# Patient Record
Sex: MALE | Race: ASIAN | NOT HISPANIC OR LATINO | Employment: UNEMPLOYED
[De-identification: names, ages, dates, MRNs, and addresses within clinical notes are randomized per-mention and may not be internally consistent; named-entity substitution may affect disease eponyms.]

---

## 2017-01-11 ENCOUNTER — RECORDS - HEALTHEAST (OUTPATIENT)
Dept: ADMINISTRATIVE | Facility: OTHER | Age: 62
End: 2017-01-11

## 2017-03-19 ENCOUNTER — COMMUNICATION - HEALTHEAST (OUTPATIENT)
Dept: INTERNAL MEDICINE | Facility: CLINIC | Age: 62
End: 2017-03-19

## 2017-03-19 DIAGNOSIS — D64.9 ANEMIA: ICD-10-CM

## 2017-03-29 ENCOUNTER — COMMUNICATION - HEALTHEAST (OUTPATIENT)
Dept: UROLOGY | Facility: CLINIC | Age: 62
End: 2017-03-29

## 2017-03-29 DIAGNOSIS — R82.994 HYPERCALCIURIA: ICD-10-CM

## 2017-04-05 ENCOUNTER — OFFICE VISIT - HEALTHEAST (OUTPATIENT)
Dept: INTERNAL MEDICINE | Facility: CLINIC | Age: 62
End: 2017-04-05

## 2017-04-05 DIAGNOSIS — N20.0 CALCULUS OF KIDNEY: ICD-10-CM

## 2017-04-05 DIAGNOSIS — R82.994 HYPERCALCIURIA: ICD-10-CM

## 2017-04-05 DIAGNOSIS — D64.9 ANEMIA: ICD-10-CM

## 2017-04-05 DIAGNOSIS — E66.3 OVERWEIGHT (BMI 25.0-29.9): ICD-10-CM

## 2017-04-05 DIAGNOSIS — E11.9 DIABETES (H): ICD-10-CM

## 2017-04-05 DIAGNOSIS — K11.7 XEROSTOMIA: ICD-10-CM

## 2017-04-05 DIAGNOSIS — E55.9 HYPOVITAMINOSIS D: ICD-10-CM

## 2017-04-05 LAB — HBA1C MFR BLD: 6.5 % (ref 3.5–6)

## 2017-04-06 ENCOUNTER — COMMUNICATION - HEALTHEAST (OUTPATIENT)
Dept: INTERNAL MEDICINE | Facility: CLINIC | Age: 62
End: 2017-04-06

## 2017-04-06 DIAGNOSIS — E11.9 DIABETES (H): ICD-10-CM

## 2017-04-06 LAB — ANA SER QL: 0.1 U

## 2017-04-24 ENCOUNTER — COMMUNICATION - HEALTHEAST (OUTPATIENT)
Dept: INTERNAL MEDICINE | Facility: CLINIC | Age: 62
End: 2017-04-24

## 2017-05-09 ENCOUNTER — COMMUNICATION - HEALTHEAST (OUTPATIENT)
Dept: INTERNAL MEDICINE | Facility: CLINIC | Age: 62
End: 2017-05-09

## 2017-05-09 DIAGNOSIS — E78.00 PURE HYPERCHOLESTEROLEMIA: ICD-10-CM

## 2017-06-21 ENCOUNTER — COMMUNICATION - HEALTHEAST (OUTPATIENT)
Dept: INTERNAL MEDICINE | Facility: CLINIC | Age: 62
End: 2017-06-21

## 2017-06-21 DIAGNOSIS — E11.9 DIABETES (H): ICD-10-CM

## 2017-06-26 ENCOUNTER — COMMUNICATION - HEALTHEAST (OUTPATIENT)
Dept: INTERNAL MEDICINE | Facility: CLINIC | Age: 62
End: 2017-06-26

## 2017-06-26 DIAGNOSIS — E11.9 DIABETES (H): ICD-10-CM

## 2017-08-06 ENCOUNTER — COMMUNICATION - HEALTHEAST (OUTPATIENT)
Dept: INTERNAL MEDICINE | Facility: CLINIC | Age: 62
End: 2017-08-06

## 2017-08-06 DIAGNOSIS — R82.994 HYPERCALCIURIA: ICD-10-CM

## 2017-10-27 ENCOUNTER — OFFICE VISIT - HEALTHEAST (OUTPATIENT)
Dept: FAMILY MEDICINE | Facility: CLINIC | Age: 62
End: 2017-10-27

## 2017-10-27 DIAGNOSIS — Z00.00 ROUTINE GENERAL MEDICAL EXAMINATION AT A HEALTH CARE FACILITY: ICD-10-CM

## 2017-10-27 DIAGNOSIS — Z23 FLU VACCINE NEED: ICD-10-CM

## 2017-10-27 DIAGNOSIS — E78.00 PURE HYPERCHOLESTEROLEMIA: ICD-10-CM

## 2017-10-27 DIAGNOSIS — E11.9 DIABETES MELLITUS (H): ICD-10-CM

## 2017-10-27 LAB
CHOLEST SERPL-MCNC: 153 MG/DL
FASTING STATUS PATIENT QL REPORTED: YES
HBA1C MFR BLD: 6.9 % (ref 3.5–6)
HDLC SERPL-MCNC: 40 MG/DL
LDLC SERPL CALC-MCNC: 91 MG/DL
TRIGL SERPL-MCNC: 112 MG/DL

## 2017-10-27 ASSESSMENT — MIFFLIN-ST. JEOR: SCORE: 1606.61

## 2017-12-03 ENCOUNTER — COMMUNICATION - HEALTHEAST (OUTPATIENT)
Dept: INTERNAL MEDICINE | Facility: CLINIC | Age: 62
End: 2017-12-03

## 2017-12-03 DIAGNOSIS — R82.994 HYPERCALCIURIA: ICD-10-CM

## 2017-12-03 DIAGNOSIS — I10 HYPERTENSION, UNSPECIFIED TYPE: ICD-10-CM

## 2018-01-08 ENCOUNTER — COMMUNICATION - HEALTHEAST (OUTPATIENT)
Dept: UROLOGY | Facility: CLINIC | Age: 63
End: 2018-01-08

## 2018-01-08 ENCOUNTER — AMBULATORY - HEALTHEAST (OUTPATIENT)
Dept: LAB | Facility: CLINIC | Age: 63
End: 2018-01-08

## 2018-01-08 DIAGNOSIS — N20.9 URINARY TRACT STONES: ICD-10-CM

## 2018-01-09 LAB
CALCIUM 24H UR-MRATE: 290 MG/24HR (ref 25–300)
CHLORIDE 24H UR-SRATE: 179 MMOL/24HR (ref 110–250)
CITRATE 24H UR-MCNC: 321 MG/24HR (ref 434–1191)
CREATININE, 24 HR URINE - HISTORICAL: 1587.6 MG/24HR (ref 1000–2000)
MAGNESIUM 24H UR-MRATE: 88 MG/24 HR (ref 75–150)
OXALATE MG/SPEC: 45.4 MG/24HR (ref 7–44)
PH UR STRIP: 7 [PH] (ref 4.5–8)
PHOSPHORUS URINE MG/SPEC: 789.6 MG/24HR (ref 400–1300)
POTASSIUM 24H UR-SCNC: 63 MMOL/24HR (ref 30–90)
SODIUM 24H UR-SRATE: 191 MMOL/24HR (ref 40–217)
SPECIMEN VOL UR: 2100 ML
URIC ACID URINE MG/SPEC: 706 MG/24HR (ref 250–750)

## 2018-01-16 ENCOUNTER — COMMUNICATION - HEALTHEAST (OUTPATIENT)
Dept: FAMILY MEDICINE | Facility: CLINIC | Age: 63
End: 2018-01-16

## 2018-01-16 DIAGNOSIS — I10 HYPERTENSION, UNSPECIFIED TYPE: ICD-10-CM

## 2018-01-24 ENCOUNTER — AMBULATORY - HEALTHEAST (OUTPATIENT)
Dept: UROLOGY | Facility: CLINIC | Age: 63
End: 2018-01-24

## 2018-01-24 DIAGNOSIS — N20.0 CALCULUS OF KIDNEY: ICD-10-CM

## 2018-01-27 ENCOUNTER — COMMUNICATION - HEALTHEAST (OUTPATIENT)
Dept: INTERNAL MEDICINE | Facility: CLINIC | Age: 63
End: 2018-01-27

## 2018-01-27 DIAGNOSIS — E11.9 DIABETES (H): ICD-10-CM

## 2018-02-05 ENCOUNTER — OFFICE VISIT - HEALTHEAST (OUTPATIENT)
Dept: UROLOGY | Facility: CLINIC | Age: 63
End: 2018-02-05

## 2018-02-05 ENCOUNTER — HOSPITAL ENCOUNTER (OUTPATIENT)
Dept: CT IMAGING | Facility: CLINIC | Age: 63
Discharge: HOME | End: 2018-02-05
Attending: PHYSICIAN ASSISTANT

## 2018-02-05 DIAGNOSIS — R82.994 HYPERCALCIURIA: ICD-10-CM

## 2018-02-05 DIAGNOSIS — N20.0 CALCULUS OF KIDNEY: ICD-10-CM

## 2018-02-05 DIAGNOSIS — R82.991 HYPOCITRATURIA: ICD-10-CM

## 2018-02-05 DIAGNOSIS — N20.9 URINARY TRACT STONES: ICD-10-CM

## 2018-02-05 DIAGNOSIS — I10 HYPERTENSION, UNSPECIFIED TYPE: ICD-10-CM

## 2018-02-05 DIAGNOSIS — R82.992 HYPEROXALURIA: ICD-10-CM

## 2018-02-05 LAB
ALBUMIN UR-MCNC: NEGATIVE MG/DL
APPEARANCE UR: CLEAR
BILIRUB UR QL STRIP: NEGATIVE
COLOR UR AUTO: YELLOW
GLUCOSE UR STRIP-MCNC: NEGATIVE MG/DL
HGB UR QL STRIP: NEGATIVE
KETONES UR STRIP-MCNC: NEGATIVE MG/DL
LEUKOCYTE ESTERASE UR QL STRIP: NEGATIVE
NITRATE UR QL: NEGATIVE
PH UR STRIP: 7 [PH] (ref 5–8)
SP GR UR STRIP: 1.01 (ref 1–1.03)
UROBILINOGEN UR STRIP-ACNC: NORMAL

## 2018-02-06 ENCOUNTER — COMMUNICATION - HEALTHEAST (OUTPATIENT)
Dept: INTERNAL MEDICINE | Facility: CLINIC | Age: 63
End: 2018-02-06

## 2018-02-06 DIAGNOSIS — E11.9 DIABETES (H): ICD-10-CM

## 2018-02-19 ENCOUNTER — OFFICE VISIT (OUTPATIENT)
Dept: OPHTHALMOLOGY | Facility: CLINIC | Age: 63
End: 2018-02-19
Attending: OPHTHALMOLOGY
Payer: COMMERCIAL

## 2018-02-19 DIAGNOSIS — H43.811 VITREOUS DEGENERATION OF RIGHT EYE: ICD-10-CM

## 2018-02-19 DIAGNOSIS — H52.203 MYOPIA WITH ASTIGMATISM AND PRESBYOPIA, BILATERAL: ICD-10-CM

## 2018-02-19 DIAGNOSIS — H52.4 MYOPIA WITH ASTIGMATISM AND PRESBYOPIA, BILATERAL: ICD-10-CM

## 2018-02-19 DIAGNOSIS — H52.13 MYOPIA WITH ASTIGMATISM AND PRESBYOPIA, BILATERAL: ICD-10-CM

## 2018-02-19 DIAGNOSIS — E11.9 TYPE 2 DIABETES MELLITUS WITHOUT COMPLICATION, WITHOUT LONG-TERM CURRENT USE OF INSULIN (H): Primary | ICD-10-CM

## 2018-02-19 PROCEDURE — G0463 HOSPITAL OUTPT CLINIC VISIT: HCPCS | Mod: ZF

## 2018-02-19 PROCEDURE — 92015 DETERMINE REFRACTIVE STATE: CPT | Mod: ZF

## 2018-02-19 RX ORDER — PRAVASTATIN SODIUM 20 MG
TABLET ORAL DAILY
COMMUNITY
End: 2021-08-06

## 2018-02-19 ASSESSMENT — VISUAL ACUITY
OD_SC: 20/30
METHOD: SNELLEN - LINEAR
OS_SC: 20/20
OS_SC+: -1
OD_SC+: -2

## 2018-02-19 ASSESSMENT — REFRACTION_MANIFEST
OS_CYLINDER: +0.50
OS_SPHERE: -0.50
OD_CYLINDER: +1.25
OS_ADD: +2.50
OS_AXIS: 150
OD_SPHERE: -0.25
OD_AXIS: 025
OD_ADD: +2.50

## 2018-02-19 ASSESSMENT — CONF VISUAL FIELD
METHOD: COUNTING FINGERS
OD_NORMAL: 1
OS_NORMAL: 1

## 2018-02-19 ASSESSMENT — TONOMETRY
OS_IOP_MMHG: 15
OD_IOP_MMHG: 14
IOP_METHOD: TONOPEN

## 2018-02-19 ASSESSMENT — SLIT LAMP EXAM - LIDS
COMMENTS: UPPER LID DERMATOCHALASIS
COMMENTS: UPPER LID DERMATOCHALASIS

## 2018-02-19 ASSESSMENT — EXTERNAL EXAM - RIGHT EYE: OD_EXAM: NORMAL

## 2018-02-19 ASSESSMENT — CUP TO DISC RATIO
OS_RATIO: 0.25
OD_RATIO: 0.25

## 2018-02-19 ASSESSMENT — EXTERNAL EXAM - LEFT EYE: OS_EXAM: NORMAL

## 2018-02-19 NOTE — NURSING NOTE
Chief Complaints and History of Present Illnesses   Patient presents with     Eye Exam For Diabetes     Yearly eye exam for DM     HPI    Affected eye(s):  Both   Symptoms:     No floaters   Flashes (Comment: In right eye to the right side much more often than usual)   No redness   No tearing   Dryness (Comment: slight dryness ocuassionaly )         Do you have eye pain now?:  No      Comments:  Pt notices when closes right eye a small blurriness runs across his vision. Bright flashes in right eye when looking to the right for the past 2 weeks. Pt says vision is the same as last time  Blood sugar 114 this morning   A1C from October 2017 is 6.7  Lab Results       Component                Value               Date                       A1C                      6.2                 05/02/2011                 A1C                      7.1                 05/25/2010              Genie Parham February 19, 2018 7:54 AM   Abelardo ROMAN February 19, 2018 8:15 AM

## 2018-02-19 NOTE — MR AVS SNAPSHOT
After Visit Summary   2018    Kit Martin    MRN: [id number]           Patient Information     Date Of Birth          1955        Visit Information        Provider Department      2018 7:45 AM Erin Cab[facility]        Today's Diagnoses     Type 2 diabetes mellitus without complication, without long-term current use of insulin (H) - Both Eyes    -  1    Myopia with astigmatism and presbyopia, bilateral - Both Eyes        Vitreous degeneration of right eye           Follow-ups after your visit        Follow-up notes from your care team     Return in about 1 year (around 2019) for Annual Visit.      Who to contact     Please call your clinic at [phone] to:    Ask questions about your health    Make or cancel appointments    Discuss your medicines    Learn about your test results    Speak to your doctor            Additional Information About Your Visit        MyChart Information     [employer] is an electronic gateway that provides easy, online access to your medical records. With [employer], you can request a clinic appointment, read your test results, renew a prescription or communicate with your care team.     To sign up for [employer] visit the website at [email or web address]   You will be asked to enter the access code listed below, as well as some personal information. Please follow the directions to create your username and password.     Your access code is: [email or web address]  Expires: 2018  6:30 AM     Your access code will  in 90 days. If you need help or a new code, please contact your [facility] or call [phone] for assistance.        Care EveryWhere ID     This is your Care EveryWhere ID. This could be used by other organizations to access your Mobile medical records  [id number]         Blood Pressure from Last 3 Encounters:   11 126/70   08/01/10 115/65   05/25/10 110/70    Weight from Last 3  Encounters:   05/02/11 83 kg (183 lb)   07/30/10 84.4 kg (186 lb)   05/25/10 88.5 kg (195 lb)              Today, you had the following     No orders found for display       Primary Care Provider Office Phone # Fax #    Naga Weir [phone] [address] [place] [address]        Equal Access to Services     [facility] AH: Hadii aad ku hadasho Soomaali, waaxda luqadaha, qaybta kaalmada adeegyada, waxay idiin hayaan adeeg kharash la'aan ah. So [facility] [phone].    ATENCIÓN: Si carley morel, tiene a kaiser disposición servicios gratuitos de asistencia lingüística. Llame al [phone].    We comply with applicable federal civil rights laws and [place] laws. We do not discriminate on the basis of race, color, national origin, age, disability, sex, sexual orientation, or gender identity.            Thank you!     Thank you for choosing [facility]  for your care. Our goal is always to provide you with excellent care. Hearing back from our patients is one way we can continue to improve our services. Please take a few minutes to complete the written survey that you may receive in the mail after your visit with us. Thank you!             Your Updated Medication List - Protect others around you: Learn how to safely use, store and throw away your medicines at www.disposemymeds.org.          This list is accurate as of 2/19/18  9:23 AM.  Always use your most recent med list.                   Brand Name Dispense Instructions for use Diagnosis    aspirin 81 MG tablet     100 tablet    Take 1 tablet by mouth daily.    Type 2 diabetes, HbA1c goal < 7% (H)       BLOOD GLUCOSE TEST STRIPS STRP     2 Strip    Check 2-4 times per day    Type II or unspecified type diabetes mellitus without mention of complication, uncontrolled       metFORMIN 500 MG tablet    GLUCOPHAGE     Take 500 mg by mouth 2 times daily (with meals)        POTASSIUM CITRATE PO      Take 1,080 mg by mouth daily        pravastatin  20 MG tablet    PRAVACHOL     Take by mouth daily        simvastatin 20 MG tablet    ZOCOR    90 tablet    Take 1 tablet by mouth At Bedtime.    Hyperlipidemia LDL goal <100       UNKNOWN TO PATIENT      1 tablet daily Pt uncertain of medication name, medication is for his kidneys        venlafaxine 150 MG 24 hr capsule    EFFEXOR-XR    90 capsule    Take 1 capsule by mouth daily. with food.    Mild major depression (H)

## 2018-02-19 NOTE — PROGRESS NOTES
HPI  Kit Martin is a 62 year old male here for diabetic annual eye exam. Diagnosed with Type II Diabetes mellitus 3-4 years ago, with HbA1c 7.7 10/2017. He complains of a single floater in his right eye with associated flashes that started a few weeks ago. No curtain.     POH: Presbyopia  GTTs: None  PMH: Type II Diabetes mellitus, last HbA1c 6.2  FH: Mother with glaucoma  SH: Non-smoker    Assessment & Plan    (E11.9) Type 2 diabetes mellitus without complication, without long-term current use of insulin (H) - Both Eyes  Comment: diagnosis 3-4 years ago; HbA1c 7.7 10/2017. No diabetic retinopathy.  Plan: Discussed the importance of tight blood glucose control in the prevention of diabetic retinopathy. Recommend yearly dilated eye exam.    (H43.811) Vitreous degeneration of right eye  Comment: No PVD seen, no Yusuf ring. Normal dilated exam.  Plan: Discussed signs/sx of RT/RD and the patient knows to call immediately if they develop these symptoms.     (H52.13,  H52.203,  H52.4) Myopia with astigmatism and presbyopia, bilateral - Both Eyes  (primary encounter diagnosis)  Comment: 20/20 best corrected, although he has good uncorrected distance acuity.  Plan: Given updated glasses Rx, optional to fill.     Patient disposition:   Return in about 1 year (around 2/19/2019) for Annual Visit. or sooner as needed.      Piotr Grijalva M.D.  Ophthalmology, PGY-2      Teaching statement:  Complete documentation of historical and exam elements from today's encounter can be found in the full encounter summary report (not reduplicated in this progress note). I personally obtained the chief complaint(s) and history of present illness.  I confirmed and edited as necessary the review of systems, past medical/surgical history, family history, social history, and examination findings as documented by others; and I examined the patient myself. I personally reviewed the relevant tests, images, and reports as documented above.     I  formulated and edited as necessary the assessment and plan and discussed the findings and management plan with the patient and family.    Erin Cabrera MD  Comprehensive Ophthalmology & Ocular Pathology  Department of Ophthalmology and Visual Neurosciences  [email or web address]  Pager [phone]

## 2018-03-12 ENCOUNTER — AMBULATORY - HEALTHEAST (OUTPATIENT)
Dept: LAB | Facility: CLINIC | Age: 63
End: 2018-03-12

## 2018-03-12 DIAGNOSIS — I10 HYPERTENSION, UNSPECIFIED TYPE: ICD-10-CM

## 2018-03-12 DIAGNOSIS — E11.9 DIABETES (H): ICD-10-CM

## 2018-03-12 LAB — AST SERPL W P-5'-P-CCNC: 19 U/L (ref 0–40)

## 2018-03-14 ENCOUNTER — COMMUNICATION - HEALTHEAST (OUTPATIENT)
Dept: FAMILY MEDICINE | Facility: CLINIC | Age: 63
End: 2018-03-14

## 2018-03-14 LAB
ANION GAP SERPL CALCULATED.3IONS-SCNC: 12 MMOL/L (ref 5–18)
CHLORIDE BLD-SCNC: 101 MMOL/L (ref 98–107)
CO2 SERPL-SCNC: 26 MMOL/L (ref 22–31)
CREAT SERPL-MCNC: 0.76 MG/DL (ref 0.7–1.3)
GFR SERPL CREATININE-BSD FRML MDRD: >60 ML/MIN/1.73M2
HBA1C MFR BLD: 7.1 % (ref 3.5–6)
POTASSIUM BLD-SCNC: 3.5 MMOL/L (ref 3.5–5)
SODIUM SERPL-SCNC: 139 MMOL/L (ref 136–145)

## 2018-03-19 ENCOUNTER — COMMUNICATION - HEALTHEAST (OUTPATIENT)
Dept: FAMILY MEDICINE | Facility: CLINIC | Age: 63
End: 2018-03-19

## 2018-04-24 ENCOUNTER — COMMUNICATION - HEALTHEAST (OUTPATIENT)
Dept: FAMILY MEDICINE | Facility: CLINIC | Age: 63
End: 2018-04-24

## 2018-04-24 DIAGNOSIS — E11.9 DM TYPE 2 (DIABETES MELLITUS, TYPE 2) (H): ICD-10-CM

## 2018-06-11 ENCOUNTER — COMMUNICATION - HEALTHEAST (OUTPATIENT)
Dept: FAMILY MEDICINE | Facility: CLINIC | Age: 63
End: 2018-06-11

## 2018-06-11 DIAGNOSIS — E78.00 PURE HYPERCHOLESTEROLEMIA: ICD-10-CM

## 2018-07-27 ENCOUNTER — COMMUNICATION - HEALTHEAST (OUTPATIENT)
Dept: FAMILY MEDICINE | Facility: CLINIC | Age: 63
End: 2018-07-27

## 2018-09-12 ENCOUNTER — COMMUNICATION - HEALTHEAST (OUTPATIENT)
Dept: FAMILY MEDICINE | Facility: CLINIC | Age: 63
End: 2018-09-12

## 2018-09-12 DIAGNOSIS — E78.00 PURE HYPERCHOLESTEROLEMIA: ICD-10-CM

## 2018-11-07 ENCOUNTER — COMMUNICATION - HEALTHEAST (OUTPATIENT)
Dept: FAMILY MEDICINE | Facility: CLINIC | Age: 63
End: 2018-11-07

## 2018-11-07 DIAGNOSIS — E11.9 DM TYPE 2 (DIABETES MELLITUS, TYPE 2) (H): ICD-10-CM

## 2018-11-12 ENCOUNTER — OFFICE VISIT - HEALTHEAST (OUTPATIENT)
Dept: FAMILY MEDICINE | Facility: CLINIC | Age: 63
End: 2018-11-12

## 2018-11-12 DIAGNOSIS — E11.9 TYPE 2 DIABETES MELLITUS WITHOUT COMPLICATION, WITHOUT LONG-TERM CURRENT USE OF INSULIN (H): ICD-10-CM

## 2018-11-12 DIAGNOSIS — E78.5 HYPERLIPIDEMIA LDL GOAL <70: ICD-10-CM

## 2018-11-12 DIAGNOSIS — Z00.00 ROUTINE GENERAL MEDICAL EXAMINATION AT A HEALTH CARE FACILITY: ICD-10-CM

## 2018-11-12 DIAGNOSIS — E11.9 DM TYPE 2 (DIABETES MELLITUS, TYPE 2) (H): ICD-10-CM

## 2018-11-12 LAB
CHOLEST SERPL-MCNC: 125 MG/DL
CREAT UR-MCNC: 42.9 MG/DL
ERYTHROCYTE [DISTWIDTH] IN BLOOD BY AUTOMATED COUNT: 13.2 % (ref 11–14.5)
FASTING STATUS PATIENT QL REPORTED: YES
HBA1C MFR BLD: 7.2 % (ref 3.5–6)
HCT VFR BLD AUTO: 41.6 % (ref 40–54)
HDLC SERPL-MCNC: 38 MG/DL
HGB BLD-MCNC: 13.1 G/DL (ref 14–18)
LDLC SERPL CALC-MCNC: 69 MG/DL
MCH RBC QN AUTO: 26.3 PG (ref 27–34)
MCHC RBC AUTO-ENTMCNC: 31.6 G/DL (ref 32–36)
MCV RBC AUTO: 83 FL (ref 80–100)
MICROALBUMIN UR-MCNC: <0.5 MG/DL (ref 0–1.99)
MICROALBUMIN/CREAT UR: NORMAL MG/G
PLATELET # BLD AUTO: 257 THOU/UL (ref 140–440)
PMV BLD AUTO: 9 FL (ref 7–10)
RBC # BLD AUTO: 4.99 MILL/UL (ref 4.4–6.2)
TRIGL SERPL-MCNC: 90 MG/DL
WBC: 6.7 THOU/UL (ref 4–11)

## 2018-11-12 ASSESSMENT — MIFFLIN-ST. JEOR: SCORE: 1595.25

## 2018-11-18 ENCOUNTER — COMMUNICATION - HEALTHEAST (OUTPATIENT)
Dept: FAMILY MEDICINE | Facility: CLINIC | Age: 63
End: 2018-11-18

## 2018-12-05 ENCOUNTER — COMMUNICATION - HEALTHEAST (OUTPATIENT)
Dept: FAMILY MEDICINE | Facility: CLINIC | Age: 63
End: 2018-12-05

## 2018-12-05 DIAGNOSIS — E11.9 DM TYPE 2 (DIABETES MELLITUS, TYPE 2) (H): ICD-10-CM

## 2018-12-26 ENCOUNTER — COMMUNICATION - HEALTHEAST (OUTPATIENT)
Dept: FAMILY MEDICINE | Facility: CLINIC | Age: 63
End: 2018-12-26

## 2018-12-26 DIAGNOSIS — E78.00 PURE HYPERCHOLESTEROLEMIA: ICD-10-CM

## 2019-01-31 ENCOUNTER — COMMUNICATION - HEALTHEAST (OUTPATIENT)
Dept: UROLOGY | Facility: CLINIC | Age: 64
End: 2019-01-31

## 2019-02-06 ENCOUNTER — AMBULATORY - HEALTHEAST (OUTPATIENT)
Dept: UROLOGY | Facility: CLINIC | Age: 64
End: 2019-02-06

## 2019-02-06 ENCOUNTER — AMBULATORY - HEALTHEAST (OUTPATIENT)
Dept: LAB | Facility: HOSPITAL | Age: 64
End: 2019-02-06

## 2019-02-06 DIAGNOSIS — N20.0 CALCULUS OF KIDNEY: ICD-10-CM

## 2019-02-06 LAB
ALBUMIN SERPL-MCNC: 4 G/DL (ref 3.5–5)
ANION GAP SERPL CALCULATED.3IONS-SCNC: 9 MMOL/L (ref 5–18)
BUN SERPL-MCNC: 10 MG/DL (ref 8–22)
CALCIUM SERPL-MCNC: 9.5 MG/DL (ref 8.5–10.5)
CHLORIDE BLD-SCNC: 99 MMOL/L (ref 98–107)
CO2 SERPL-SCNC: 31 MMOL/L (ref 22–31)
CREAT SERPL-MCNC: 0.78 MG/DL (ref 0.7–1.3)
GFR SERPL CREATININE-BSD FRML MDRD: >60 ML/MIN/1.73M2
GLUCOSE BLD-MCNC: 124 MG/DL (ref 70–125)
PHOSPHATE SERPL-MCNC: 2.6 MG/DL (ref 2.5–4.5)
POTASSIUM BLD-SCNC: 3.2 MMOL/L (ref 3.5–5)
SODIUM SERPL-SCNC: 139 MMOL/L (ref 136–145)

## 2019-02-07 ENCOUNTER — COMMUNICATION - HEALTHEAST (OUTPATIENT)
Dept: UROLOGY | Facility: CLINIC | Age: 64
End: 2019-02-07

## 2019-02-07 DIAGNOSIS — R82.991 HYPOCITRATURIA: ICD-10-CM

## 2019-02-07 LAB
COLLECT DURATION TIME SPEC: 24 HR
CREAT 24H UR-MRATE: 1305 MG/D (ref 800–2100)
CREAT UR-MCNC: 29 MG/DL
MAGNESIUM 24H UR-MRATE: 158 MG/D (ref 12–199)
MAGNESIUM UR-MCNC: 3.5 MG/DL
SPECIMEN VOL ?TM UR: 4500 ML

## 2019-02-08 LAB
CALCIUM 24H UR-MRATE: 383 MG/24HR (ref 25–300)
CHLORIDE 24H UR-SRATE: 234 MMOL/24HR (ref 110–250)
CITRATE 24H UR-MCNC: 613 MG/24HR
CREATININE, 24 HR URINE - HISTORICAL: ABNORMAL MG/24HR
MAGNESIUM 24H UR-MRATE: ABNORMAL MG/24 HR (ref 75–150)
OXALATE MG/SPEC: 57.6 MG/24HR (ref 7–44)
PH UR STRIP: 7 [PH] (ref 4.5–8)
PHOSPHORUS URINE MG/SPEC: 603 MG/24HR
POTASSIUM 24H UR-SCNC: 112 MMOL/24HR (ref 30–90)
SODIUM 24H UR-SRATE: 194 MMOL/24HR (ref 40–217)
SPECIMEN VOL UR: 4500 ML
URIC ACID URINE MG/SPEC: 522 MG/24HR (ref 250–750)

## 2019-02-12 ENCOUNTER — COMMUNICATION - HEALTHEAST (OUTPATIENT)
Dept: UROLOGY | Facility: CLINIC | Age: 64
End: 2019-02-12

## 2019-02-12 DIAGNOSIS — Z87.442 HISTORY OF KIDNEY STONES: ICD-10-CM

## 2019-02-18 ENCOUNTER — HOSPITAL ENCOUNTER (OUTPATIENT)
Dept: CT IMAGING | Facility: CLINIC | Age: 64
Discharge: HOME | End: 2019-02-18
Attending: PHYSICIAN ASSISTANT

## 2019-02-18 ENCOUNTER — OFFICE VISIT - HEALTHEAST (OUTPATIENT)
Dept: UROLOGY | Facility: CLINIC | Age: 64
End: 2019-02-18

## 2019-02-18 DIAGNOSIS — R82.992 HYPEROXALURIA: ICD-10-CM

## 2019-02-18 DIAGNOSIS — R82.994 HYPERCALCIURIA: ICD-10-CM

## 2019-02-18 DIAGNOSIS — Z87.442 HISTORY OF KIDNEY STONES: ICD-10-CM

## 2019-02-18 DIAGNOSIS — N20.0 CALCULUS OF KIDNEY: ICD-10-CM

## 2019-04-22 ENCOUNTER — AMBULATORY - HEALTHEAST (OUTPATIENT)
Dept: LAB | Facility: HOSPITAL | Age: 64
End: 2019-04-22

## 2019-04-22 DIAGNOSIS — N20.0 CALCULUS OF KIDNEY: ICD-10-CM

## 2019-04-23 LAB
CALCIUM 24H UR-MRATE: 319 MG/24HR (ref 25–300)
CHLORIDE 24H UR-SRATE: 204 MMOL/24HR (ref 110–250)
CITRATE 24H UR-MCNC: 153 MG/24HR
COLLECT DURATION TIME SPEC: 24 HR
CREAT 24H UR-MRATE: 1595 MG/D (ref 800–2100)
CREAT UR-MCNC: 58 MG/DL
CREATININE, 24 HR URINE - HISTORICAL: ABNORMAL MG/24HR
MAGNESIUM 24H UR-MRATE: 124 MG/D (ref 12–199)
MAGNESIUM 24H UR-MRATE: ABNORMAL MG/24 HR (ref 75–150)
MAGNESIUM UR-MCNC: 4.5 MG/DL
OXALATE MG/SPEC: 51.2 MG/24HR (ref 7–44)
PH UR STRIP: 7 [PH] (ref 4.5–8)
PHOSPHORUS URINE MG/SPEC: 500.5 MG/24HR
POTASSIUM 24H UR-SCNC: 104 MMOL/24HR (ref 30–90)
SODIUM 24H UR-SRATE: 162 MMOL/24HR (ref 40–217)
SPECIMEN VOL ?TM UR: 2750 ML
SPECIMEN VOL UR: 2750 ML
URIC ACID URINE MG/SPEC: 446 MG/24HR (ref 250–750)

## 2019-04-28 ENCOUNTER — COMMUNICATION - HEALTHEAST (OUTPATIENT)
Dept: UROLOGY | Facility: CLINIC | Age: 64
End: 2019-04-28

## 2019-04-28 DIAGNOSIS — I10 HYPERTENSION, UNSPECIFIED TYPE: ICD-10-CM

## 2019-05-07 ENCOUNTER — COMMUNICATION - HEALTHEAST (OUTPATIENT)
Dept: UROLOGY | Facility: CLINIC | Age: 64
End: 2019-05-07

## 2019-05-07 DIAGNOSIS — R82.991 HYPOCITRATURIA: ICD-10-CM

## 2019-05-20 ENCOUNTER — OFFICE VISIT - HEALTHEAST (OUTPATIENT)
Dept: UROLOGY | Facility: CLINIC | Age: 64
End: 2019-05-20

## 2019-05-20 DIAGNOSIS — Z87.442 HISTORY OF KIDNEY STONES: ICD-10-CM

## 2019-05-20 DIAGNOSIS — N20.0 CALCULUS OF KIDNEY: ICD-10-CM

## 2019-05-20 LAB
ALBUMIN UR-MCNC: NEGATIVE MG/DL
APPEARANCE UR: ABNORMAL
BILIRUB UR QL STRIP: NEGATIVE
COLOR UR AUTO: YELLOW
GLUCOSE UR STRIP-MCNC: NEGATIVE MG/DL
HGB UR QL STRIP: NEGATIVE
KETONES UR STRIP-MCNC: NEGATIVE MG/DL
LEUKOCYTE ESTERASE UR QL STRIP: NEGATIVE
NITRATE UR QL: NEGATIVE
PH UR STRIP: 6.5 [PH] (ref 5–8)
SP GR UR STRIP: 1.01 (ref 1–1.03)
UROBILINOGEN UR STRIP-ACNC: ABNORMAL

## 2019-07-30 ENCOUNTER — OFFICE VISIT (OUTPATIENT)
Dept: OPHTHALMOLOGY | Facility: CLINIC | Age: 64
End: 2019-07-30
Payer: COMMERCIAL

## 2019-07-30 DIAGNOSIS — H25.13 NUCLEAR SCLEROTIC CATARACT OF BOTH EYES: ICD-10-CM

## 2019-07-30 DIAGNOSIS — H52.223 REGULAR ASTIGMATISM OF BOTH EYES: ICD-10-CM

## 2019-07-30 DIAGNOSIS — H52.4 PRESBYOPIA OF BOTH EYES: ICD-10-CM

## 2019-07-30 DIAGNOSIS — E11.9 TYPE 2 DIABETES MELLITUS WITHOUT COMPLICATION, WITHOUT LONG-TERM CURRENT USE OF INSULIN (H): Primary | ICD-10-CM

## 2019-07-30 ASSESSMENT — REFRACTION_MANIFEST
OD_AXIS: 021
OS_ADD: +2.25
OS_CYLINDER: +0.75
OD_SPHERE: +0.25
OS_AXIS: 152
OD_ADD: +2.25
OS_SPHERE: -0.25
OD_CYLINDER: +1.25

## 2019-07-30 ASSESSMENT — SLIT LAMP EXAM - LIDS
COMMENTS: UPPER LID DERMATOCHALASIS
COMMENTS: UPPER LID DERMATOCHALASIS

## 2019-07-30 ASSESSMENT — CUP TO DISC RATIO
OD_RATIO: 0.25
OS_RATIO: 0.25

## 2019-07-30 ASSESSMENT — VISUAL ACUITY
OS_SC: 20/20
OD_SC: 20/20
METHOD_MR: SIGNS ARE CORRECT
METHOD: SNELLEN - LINEAR
OD_SC+: -3

## 2019-07-30 ASSESSMENT — CONF VISUAL FIELD
OS_NORMAL: 1
OD_NORMAL: 1
METHOD: COUNTING FINGERS

## 2019-07-30 ASSESSMENT — TONOMETRY
OS_IOP_MMHG: 14
OD_IOP_MMHG: 14
IOP_METHOD: ICARE

## 2019-07-30 ASSESSMENT — EXTERNAL EXAM - RIGHT EYE: OD_EXAM: NORMAL

## 2019-07-30 ASSESSMENT — EXTERNAL EXAM - LEFT EYE: OS_EXAM: NORMAL

## 2019-07-30 NOTE — PROGRESS NOTES
HPI  Kit Martin is a 64 year old male here for diabetic annual eye exam. Diagnosed with Type II Diabetes mellitus around 2014, with HbA1c 7.2 recently.  Feels vision not as clear as it used to be both eyes.     POH: Presbyopia, mostly over the counter readers   GTTs: None  PMH: Type II Diabetes mellitus, last HbA1c 7.2 - changing pcp  FH: Mother with glaucoma  SH: Non-smoker    Assessment & Plan       (E11.9) Type 2 diabetes mellitus without complication, without long-term current use of insulin (H) - Both Eyes  (primary encounter diagnosis)  Comment: No diabetic retinopathy.  Plan: Discussed the importance of tight blood glucose control in the prevention of diabetic retinopathy. Recommend yearly dilated eye exam.    (H52.4) Presbyopia of both eyes - Both Eyes  (H52.223) Regular astigmatism of both eyes - Both Eyes  Comment: may benefit from small distance prescription   Plan: manifest refraction done and prescription for glasses given     (H25.13) Nuclear sclerotic cataract of both eyes - Both Eyes  Comment: mild not visually significant   Plan: follow    Complete documentation of historical and exam elements from today's encounter can be found in the full encounter summary report (not reduplicated in this progress note). I personally obtained the chief complaint(s) and history of present illness.  I have confirmed and edited as necessary the CC, HPI, PMH/PSH, social history, FMH, ROS, and exam/neuro findings as obtained by the technician or others. I have examined this patient myself and I personally viewed the image(s) and studies listed above and the documentation reflects my findings and interpretation.  I formulated and edited as necessary the assessment and plan and discussed the findings and management plan with the patient and family.     Melanie Mata MD

## 2019-09-06 ENCOUNTER — COMMUNICATION - HEALTHEAST (OUTPATIENT)
Dept: FAMILY MEDICINE | Facility: CLINIC | Age: 64
End: 2019-09-06

## 2019-09-06 DIAGNOSIS — E11.9 DM TYPE 2 (DIABETES MELLITUS, TYPE 2) (H): ICD-10-CM

## 2019-09-09 ENCOUNTER — COMMUNICATION - HEALTHEAST (OUTPATIENT)
Dept: FAMILY MEDICINE | Facility: CLINIC | Age: 64
End: 2019-09-09

## 2019-09-09 DIAGNOSIS — E11.9 DM TYPE 2 (DIABETES MELLITUS, TYPE 2) (H): ICD-10-CM

## 2019-09-23 ENCOUNTER — OFFICE VISIT - HEALTHEAST (OUTPATIENT)
Dept: FAMILY MEDICINE | Facility: CLINIC | Age: 64
End: 2019-09-23

## 2019-09-23 DIAGNOSIS — E78.00 PURE HYPERCHOLESTEROLEMIA: ICD-10-CM

## 2019-09-23 DIAGNOSIS — I10 HYPERTENSION, UNSPECIFIED TYPE: ICD-10-CM

## 2019-09-23 DIAGNOSIS — E11.9 DM TYPE 2 (DIABETES MELLITUS, TYPE 2) (H): ICD-10-CM

## 2019-09-23 LAB
ANION GAP SERPL CALCULATED.3IONS-SCNC: 13 MMOL/L (ref 5–18)
CHLORIDE BLD-SCNC: 98 MMOL/L (ref 98–107)
CHOLEST SERPL-MCNC: 124 MG/DL
CO2 SERPL-SCNC: 29 MMOL/L (ref 22–31)
CREAT SERPL-MCNC: 0.77 MG/DL (ref 0.7–1.3)
FASTING STATUS PATIENT QL REPORTED: YES
GFR SERPL CREATININE-BSD FRML MDRD: >60 ML/MIN/1.73M2
HBA1C MFR BLD: 7 % (ref 3.5–6)
HDLC SERPL-MCNC: 39 MG/DL
LDLC SERPL CALC-MCNC: 63 MG/DL
POTASSIUM BLD-SCNC: 3.6 MMOL/L (ref 3.5–5)
SODIUM SERPL-SCNC: 140 MMOL/L (ref 136–145)
TRIGL SERPL-MCNC: 110 MG/DL

## 2019-09-23 ASSESSMENT — MIFFLIN-ST. JEOR: SCORE: 1592.05

## 2020-03-20 ENCOUNTER — COMMUNICATION - HEALTHEAST (OUTPATIENT)
Dept: FAMILY MEDICINE | Facility: CLINIC | Age: 65
End: 2020-03-20

## 2020-03-20 DIAGNOSIS — E11.9 DM TYPE 2 (DIABETES MELLITUS, TYPE 2) (H): ICD-10-CM

## 2020-06-14 ENCOUNTER — COMMUNICATION - HEALTHEAST (OUTPATIENT)
Dept: FAMILY MEDICINE | Facility: CLINIC | Age: 65
End: 2020-06-14

## 2020-06-14 DIAGNOSIS — E11.9 DM TYPE 2 (DIABETES MELLITUS, TYPE 2) (H): ICD-10-CM

## 2020-06-17 ENCOUNTER — AMBULATORY - HEALTHEAST (OUTPATIENT)
Dept: LAB | Facility: CLINIC | Age: 65
End: 2020-06-17

## 2020-06-17 DIAGNOSIS — E11.9 DM TYPE 2 (DIABETES MELLITUS, TYPE 2) (H): ICD-10-CM

## 2020-06-17 LAB — HBA1C MFR BLD: 6.4 % (ref 3.5–6)

## 2020-06-19 ENCOUNTER — OFFICE VISIT - HEALTHEAST (OUTPATIENT)
Dept: FAMILY MEDICINE | Facility: CLINIC | Age: 65
End: 2020-06-19

## 2020-06-19 DIAGNOSIS — E78.00 PURE HYPERCHOLESTEROLEMIA: ICD-10-CM

## 2020-06-19 DIAGNOSIS — E11.9 DM TYPE 2 (DIABETES MELLITUS, TYPE 2) (H): ICD-10-CM

## 2020-07-03 ENCOUNTER — OFFICE VISIT - HEALTHEAST (OUTPATIENT)
Dept: INTERNAL MEDICINE | Facility: CLINIC | Age: 65
End: 2020-07-03

## 2020-07-03 DIAGNOSIS — Z20.822 EXPOSURE TO COVID-19 VIRUS: ICD-10-CM

## 2020-07-03 DIAGNOSIS — Z13.220 ENCOUNTER FOR SCREENING FOR LIPOID DISORDERS: ICD-10-CM

## 2020-07-03 DIAGNOSIS — Z00.00 ENCOUNTER FOR GENERAL ADULT MEDICAL EXAMINATION WITHOUT ABNORMAL FINDINGS: ICD-10-CM

## 2020-07-03 DIAGNOSIS — N20.9 URINARY TRACT STONES: ICD-10-CM

## 2020-07-03 DIAGNOSIS — E78.00 PURE HYPERCHOLESTEROLEMIA: ICD-10-CM

## 2020-07-03 DIAGNOSIS — L30.9 DERMATITIS: ICD-10-CM

## 2020-07-03 DIAGNOSIS — E11.9 DM TYPE 2 (DIABETES MELLITUS, TYPE 2) (H): ICD-10-CM

## 2020-07-03 LAB
ALBUMIN UR-MCNC: NEGATIVE MG/DL
ANION GAP SERPL CALCULATED.3IONS-SCNC: 14 MMOL/L (ref 5–18)
APPEARANCE UR: CLEAR
BILIRUB UR QL STRIP: NEGATIVE
BUN SERPL-MCNC: 7 MG/DL (ref 8–22)
CALCIUM SERPL-MCNC: 9.8 MG/DL (ref 8.5–10.5)
CHLORIDE BLD-SCNC: 100 MMOL/L (ref 98–107)
CHOLEST SERPL-MCNC: 137 MG/DL
CO2 SERPL-SCNC: 27 MMOL/L (ref 22–31)
COLOR UR AUTO: YELLOW
CREAT SERPL-MCNC: 0.77 MG/DL (ref 0.7–1.3)
CREAT UR-MCNC: 88.9 MG/DL
FASTING STATUS PATIENT QL REPORTED: NORMAL
GFR SERPL CREATININE-BSD FRML MDRD: >60 ML/MIN/1.73M2
GLUCOSE BLD-MCNC: 102 MG/DL (ref 70–125)
GLUCOSE UR STRIP-MCNC: NEGATIVE MG/DL
HDLC SERPL-MCNC: 41 MG/DL
HGB UR QL STRIP: NEGATIVE
KETONES UR STRIP-MCNC: NEGATIVE MG/DL
LDLC SERPL CALC-MCNC: 69 MG/DL
LEUKOCYTE ESTERASE UR QL STRIP: NEGATIVE
MICROALBUMIN UR-MCNC: <0.5 MG/DL (ref 0–1.99)
MICROALBUMIN/CREAT UR: NORMAL MG/G{CREAT}
NITRATE UR QL: NEGATIVE
PH UR STRIP: 8.5 [PH] (ref 5–8)
POTASSIUM BLD-SCNC: 3.9 MMOL/L (ref 3.5–5)
SODIUM SERPL-SCNC: 141 MMOL/L (ref 136–145)
SP GR UR STRIP: 1.01 (ref 1–1.03)
TRIGL SERPL-MCNC: 133 MG/DL
UROBILINOGEN UR STRIP-ACNC: ABNORMAL

## 2020-07-03 ASSESSMENT — MIFFLIN-ST. JEOR: SCORE: 1579.59

## 2020-07-08 ENCOUNTER — COMMUNICATION - HEALTHEAST (OUTPATIENT)
Dept: INTERNAL MEDICINE | Facility: CLINIC | Age: 65
End: 2020-07-08

## 2020-07-09 ENCOUNTER — COMMUNICATION - HEALTHEAST (OUTPATIENT)
Dept: FAMILY MEDICINE | Facility: CLINIC | Age: 65
End: 2020-07-09

## 2021-03-09 ENCOUNTER — COMMUNICATION - HEALTHEAST (OUTPATIENT)
Dept: FAMILY MEDICINE | Facility: CLINIC | Age: 66
End: 2021-03-09

## 2021-05-18 ENCOUNTER — RECORDS - HEALTHEAST (OUTPATIENT)
Dept: FAMILY MEDICINE | Facility: CLINIC | Age: 66
End: 2021-05-18

## 2021-05-19 ENCOUNTER — COMMUNICATION - HEALTHEAST (OUTPATIENT)
Dept: INTERNAL MEDICINE | Facility: CLINIC | Age: 66
End: 2021-05-19

## 2021-05-28 ENCOUNTER — RECORDS - HEALTHEAST (OUTPATIENT)
Dept: ADMINISTRATIVE | Facility: CLINIC | Age: 66
End: 2021-05-28

## 2021-05-28 NOTE — PROGRESS NOTES
Assessment/Plan:        Diagnoses and all orders for this visit:    Calculus of kidney  -     Stone Formation, 24 Hour Urine x1; Future; Expected date: 11/20/2019  -     Patient Stated Goal: Prevent further stones  -     CT Abdomen Pelvis Without Oral Without IV Contrast; Future; Expected date: 11/20/2019    History of kidney stones  -     Urinalysis Macroscopic      Stone Management Plan  [facility] Stone Management 2/5/2018 2/21/2019 5/20/2019   Urinary Tract Infection No suspicion of infection No suspicion of infection No suspicion of infection   Renal Colic Asymptomatic at this time Well controlled symptoms Asymptomatic at this time   Renal Failure No suspicion of renal failure No suspicion of renal failure No suspicion of renal failure   Current CT date 2/5/2018 2/18/2019 -   Right sided stones? Yes Yes -   R Number of ureteral stones No ureteral stones No ureteral stones -   R Number of kidney stones  2 Renal stones unchanged from last exam -   R GSD of kidney stones 2 - 4 - -   R Hydronephrosis None - -   R Stone Event No current event No current event -   Diagnosis date - - -   Initial location of primary symptomatic stone - - -   Initial GSD of primary symptomatic stone - - -   Resolved date - - -   R Post-op status - - -   R Current Plan Observe Observe -   Clear rationale - - -   Observe rationale Limited stone burden with good prognosis for spontaneous passage Limited stone burden with good prognosis for spontaneous passage -   Left sided stones? Yes Yes -   L Number of ureteral stones No ureteral stones - -   L Number of kidney stones  3 Renal stones unchanged from last exam -   L GSD of kidney stones 4 - 10 - -   L Hydronephrosis None - -   L Stone Event No current event No current event -   Diagnosis date - - -   Initial location of primary symptomatic stone - - -   Initial GSD of primary symptomatic stone - - -   Resolved date - - -   Post-op status - - -   L Current Plan Observe Observe -   Clear rationale  - - -   Observe rationale Limited stone burden with good prognosis for spontaneous passage Significant stone burden amenable to emergency management -             Subjective:      HPI  Mr. Kit Martin is a 63 y.o. German male returning to the [facility] for follow up of his stone disease.    He has been well in the interim. He has been attempting to reduce salt intake and has noticed that occasionally, some foods are tasting uncomfortably salty which is a good sign.    24 h urine demonstrates slight improvement in hypercalciuria and urine sodium but citrate is quite low.    He will stay on 25 mg chlorthalidone and increase pot citrate to 20 mEq two times a day. Will transition to OTC powder. Will continue to try to avoid dietary sodium.    Will recheck in 6 months with 24 h urine and CT     ROS   Review of systems is negative except for HPI.    Past Medical History:   Diagnosis Date     Anxiety      Depression      GERD (gastroesophageal reflux disease)      Hypercholesterolemia      Hypertension      Kidney stone      Overweight      PONV (postoperative nausea and vomiting)      Type 2 diabetes mellitus (H)        Past Surgical History:   Procedure Laterality Date     CYSTOSCOPY W/ URETERAL STENT PLACEMENT Bilateral 11/3/2015    Procedure: CYSTOSCOPY, BILATERAL URETEROSCOPY, LASER LITHOTRIPSY, STENT INSERTION;  Surgeon: Herman Lucia MD;  Location: [facility];  Service:      LITHOTRIPSY       WI CYSTOTOMY,EXTRACT &/OR FRAG URETER CALC      Description: Bladder Cystotomy With Basket Extraction Of Calculus;  Recorded: 02/01/2012;     WI FRAGMENT KIDNEY STONE/ ESWL Left     Description: Lithotripsy;  Recorded: 02/01/2012;  Comments: TIMES TWO.     URETEROSCOPY         Current Outpatient Medications   Medication Sig Dispense Refill     chlorthalidone (HYGROTEN) 25 MG tablet TAKE 1 TABLET (25 MG TOTAL) BY MOUTH DAILY. 90 tablet 4     generic lancets Use 1 each As Directed as  needed. OneTouch Lancets Miscellaneous. Use four times daily. 100 each 11     metFORMIN (GLUCOPHAGE) 500 MG tablet Take 2 tablets (1,000 mg total) by mouth daily with breakfast AND 1 tablet (500 mg total) daily with supper. 270 tablet 2     potassium citrate (UROCIT-K) 10 mEq (1,080 mg) SR tablet TAKE 2 TABLETS (20 MEQ TOTAL) BY MOUTH DAILY WITH BREAKFAST. 180 tablet 0     pravastatin (PRAVACHOL) 20 MG tablet Take 1 tablet (20 mg total) by mouth at bedtime. 90 tablet 3     ACCU-CHEK SMARTVIEW TEST STRIP strips TEST 1 TO 2 TIMES DAILY 100 strip 1     aspirin 81 MG EC tablet Take 1 tablet (81 mg total) by mouth daily.  0     blood glucose meter (ONETOUCH ULTRA2) With device kit. Test four times daily Dx: 250       No current facility-administered medications for this visit.        Allergies   Allergen Reactions     Dilaudid [Hydromorphone] Itching       Social History     Socioeconomic History     Marital status:      Spouse name: Not on file     Number of children: Not on file     Years of education: Not on file     Highest education level: Not on file   Occupational History     Occupation: [employer]     Employer: [employer]   Social Needs     Financial resource strain: Not on file     Food insecurity:     Worry: Not on file     Inability: Not on file     Transportation needs:     Medical: Not on file     Non-medical: Not on file   Tobacco Use     Smoking status: Never Smoker     Smokeless tobacco: Never Used   Substance and Sexual Activity     Alcohol use: Yes     Comment: Less than weekly.     Drug use: No     Sexual activity: Yes     Partners: Female   Lifestyle     Physical activity:     Days per week: Not on file     Minutes per session: Not on file     Stress: Not on file   Relationships     Social connections:     Talks on phone: Not on file     Gets together: Not on file     Attends Sabianism service: Not on file     Active member of club or organization: Not on file      "Attends meetings of clubs or organizations: Not on file     Relationship status: Not on file     Intimate partner violence:     Fear of current or ex partner: Not on file     Emotionally abused: Not on file     Physically abused: Not on file     Forced sexual activity: Not on file   Other Topics Concern     Not on file   Social History Narrative    .  Lives with wife.  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  Has 2 adult daughters.         Family History   Problem Relation Age of Onset     Heart disease Mother 67        Heart attack     Stroke Father          at 93     Other Sister 15         at 15, two years post surgery for non-union of a leg fracture that never did heal.     Stroke Brother         Mild stroke     Hyperlipidemia Brother      Hypertension Brother      Urolithiasis Brother         recurrent     Heart disease Brother 61        \"Heart attack\"     No Medical Problems Daughter      Hypertension Brother      Diabetes Brother      No Medical Problems Daughter      Objective:      Physical Exam  Vitals:    19 0846   BP: 124/64   Pulse: 82   Temp: 98.1  F (36.7  C)     General - well developed, well nourished, appropriate for age. Appears no distress at this time  Abdomen - mildly obese soft, non-tender, no hepatosplenomegaly, no masses.   - no flank tenderness, no suprapubic tenderness, kidney and bladder non-palpable  MSK - normal spinal curvature. no spinal tenderness. normal gait. muscular strength intact.  Psych - oriented to time, place, and person, normal mood and affect.      Labs   Urinalysis POC (Office):  Blood UA   Date Value Ref Range Status   2016 Trace Negative Final   2016 Negative Negative Final   2016 Negative Negative Final     Nitrite, UA   Date Value Ref Range Status   2019 Negative Negative Final   2019 Negative Negative Final   2018 Negative Negative Final     Leukocytes, UA    Date Value Ref Range Status   2016 Negative Negative " Final   07/13/2016 Negative Negative Final   02/29/2016 Negative Negative Final     pH UA   Date Value Ref Range Status   09/19/2016 6.5 4.5 - 8.0 Final   07/13/2016 7.0 4.5 - 8.0 Final   02/29/2016 6.5 4.5 - 8.0 Final       Lab Urinalysis:  Blood, UA   Date Value Ref Range Status   05/20/2019 Negative Negative Final   02/18/2019 Negative Negative Final   02/05/2018 Negative Negative Final     Nitrite, UA   Date Value Ref Range Status   05/20/2019 Negative Negative Final   02/18/2019 Negative Negative Final   02/05/2018 Negative Negative Final     Leukocytes, UA   Date Value Ref Range Status   05/20/2019 Negative Negative Final   02/18/2019 Negative Negative Final   02/05/2018 Negative Negative Final     pH, UA   Date Value Ref Range Status   05/20/2019 6.5 5.0 - 8.0 Final   02/18/2019 7.0 5.0 - 8.0 Final   02/05/2018 7.0 5.0 - 8.0 Final    and Stone prevention labs   24 hour urine   Creatinine, 24 Hour Urine   Date Value Ref Range Status   03/02/2012 1,241.2 1,000.0 - 2,000.0 mg/24Hr Final   01/10/2012 1,008.9 1,000.0 - 2,000.0 mg/24Hr Final   11/01/2011 1,749.0 1,000.0 - 2,000.0 mg/24Hr Final     Calcium, 24H Urine   Date Value Ref Range Status   04/22/2019 319 (H) 25 - 300 mg/24hr Final     Comment:       Hypercalciuria >350  Values are for persons with  average daily calcium intake  (600-800 mg/day)   02/06/2019 383 (H) 25 - 300 mg/24hr Final     Comment:       Hypercalciuria >350  Values are for persons with  average daily calcium intake  (600-800 mg/day)   01/08/2018 290 25 - 300 mg/24hr Final     Comment:       Hypercalciuria >350  Values are for persons with  average daily calcium intake  (600-800 mg/day)     Sodium, 24 Hour Urine   Date Value Ref Range Status   04/22/2019 162 40 - 217 mmol/24hr Final   02/06/2019 194 40 - 217 mmol/24hr Final   01/08/2018 191 40 - 217 mmol/24hr Final     Citrate, 24 Hour Urine   Date Value Ref Range Status   04/22/2019 153 (L) 434-1,191 mg/24hr Final     Comment:        Reference Ranges are not  established for 0-19 years  or >60 years of age.   02/06/2019 613 434-1,191 mg/24hr Final     Comment:       Reference Ranges are not  established for 0-19 years  or >60 years of age.   01/08/2018 321 (L) 434 - 1,191 mg/24hr Final     Comment:       Reference Ranges are not  established for 0-19 years  or >60 years of age.     Oxalate, 24 Hour Urine   Date Value Ref Range Status   04/22/2019 51.2 (H) 7.0 - 44.0 mg/24hr Final   02/06/2019 57.6 (H) 7.0 - 44.0 mg/24hr Final   01/08/2018 45.4 (H) 7.0 - 44.0 mg/24hr Final     pH, Urine   Date Value Ref Range Status   04/22/2019 7.0 4.5 - 8.0 Final   02/06/2019 7.0 4.5 - 8.0 Final   01/08/2018 7.0 4.5 - 8.0 Final     Urine Volume   Date Value Ref Range Status   04/22/2019 2,750 mL Final   02/06/2019 4,500 mL Final   01/08/2018 2,100 mL Final

## 2021-05-28 NOTE — PATIENT INSTRUCTIONS - HE
Patient Stated Goal: Prevent further stones  Potassium Citrate    This medication can be used in two ways to help stone formers.      Citrate in the urine makes it harder for stones to form and grow. Many stone patients do not have enough citrate in their urine and require a supplement.        Citrate is an effective way to make the urine less acidic. Uric acid stones only form when the urine is acidic and may dissolve if urine acidity can be reversed.      USES: This medication is used for low urinary citrate (hypocitraturia), renal tubular acidosis, uric acid stones, calcium oxalate stones, and cysteine stones.    HOW TO USE:     Potassium citrate comes in several different forms      Tablet - made of potassium citrate in a wax substance to allow delayed absorption. It is common to see a  ghost tablet  in the stool after all the potassium citrate has been absorbed. This is more common in people with diarrhea or other bowel disorders.      Powder - this can be dissolved in water or other clear fluids. To decrease stomach irritation and improve absorption, we recommend dissolving in at least 500 cc of fluid and drinking slowly. You do not have to drink this medication all at once. Many patients find the taste is improved by adding a sweetener.      Liquid - Similar to the powder, the liquid formulation is best dissolved in a large container of fluid and consumed slowly.     SIDE EFFECTS:  The primary side effect of this medication is stomach irritation, less so with the tablet. Some people find stomach irritation decreased by taking the medication with food and avoiding lying down for at least a half hour after consumption.    If you develop diarrhea, nausea, vomiting, stomach pain, fluid retention, convulsions, unusual weakness, mental confusion, tingling or numbness of the hands or feet or other potential side effects, notify your doctor or pharmacist promptly.                    *This is a summary and does not  contain all possible information about this product. For complete information about this product or your specific health needs, ask your healthcare professional. Always seek the advice of your healthcare professional if you have any questions about this product or your medical condition.

## 2021-05-30 VITALS — BODY MASS INDEX: 27.23 KG/M2 | WEIGHT: 189.8 LBS

## 2021-05-31 VITALS — BODY MASS INDEX: 28.7 KG/M2 | WEIGHT: 186 LBS

## 2021-05-31 VITALS — BODY MASS INDEX: 28.23 KG/M2 | WEIGHT: 186.3 LBS | HEIGHT: 68 IN

## 2021-06-01 ENCOUNTER — RECORDS - HEALTHEAST (OUTPATIENT)
Dept: ADMINISTRATIVE | Facility: CLINIC | Age: 66
End: 2021-06-01

## 2021-06-01 NOTE — PROGRESS NOTES
Assessment / Impression     1. DM - Type 2 diabetes  Glycosylated Hemoglobin A1c   2. Hypertension, unspecified type  Electrolyte Profile    Creatinine   3. Hypercholesterolemia  Lipid Cascade         Plan:     1. DM - Type 2 diabetes  Reviewed today's hemoglobin A1c today which has improved slightly to 7.0.  He is right on the borderline of goal, offered the option to increase metformin to 1000 mg twice daily, though patient declined at this time.  He will continue regular exercise and watch his diet.  Continue daily aspirin use.  -EMILY completed to get recent ophthalmology visit note.  - Glycosylated Hemoglobin A1c    2. Hypertension, unspecified type  At goal, will refill medications, check labs today.  - Electrolyte Profile  - Creatinine    3. Hypercholesterolemia  Fasting today, will check labs.  Refills of medication were given today.  - Lipid Cascade    Declined influenza vaccine.    Return in about 6 months (around 3/23/2020) for Diabetes Follow-up.    Subjective:      HPI: Kit Martin is a 64 y.o. male who presents for diabetes check.  He is currently taking metformin 1000 mg in the morning and 500 mg in the evening.  He checks his sugars regularly, fasting sugars are usually 105-120, however has noted that if he eats knots the night before he has noted a spike in his morning fasting glucose.  He denies any adverse effects of metformin.  -He had a recent eye exam within the last few months.    Hypertension: Currently only taking chlorthalidone.  Denies any adverse effects medication.  No headache, chest pain, vision changes.    Hyperlipidemia: He is fasting today.  Currently taking pravastatin.  No myalgias.    Medical History:     Patient Active Problem List   Diagnosis     Hypercholesterolemia     Anxiety Disorder NOS     DM - Type 2 diabetes     Overweight (BMI 25.0-29.9)     Hypercalciuria     Anemia     Leukocytosis     Urinary tract stones     Hypocitraturia       Past Medical History:   Diagnosis  Date     Anxiety      Depression      GERD (gastroesophageal reflux disease)      Hypercholesterolemia      Hypertension      Kidney stone      Overweight      PONV (postoperative nausea and vomiting)      Type 2 diabetes mellitus (H)        Past Surgical History:   Procedure Laterality Date     CYSTOSCOPY W/ URETERAL STENT PLACEMENT Bilateral 11/3/2015    Procedure: CYSTOSCOPY, BILATERAL URETEROSCOPY, LASER LITHOTRIPSY, STENT INSERTION;  Surgeon: Herman Lucia MD;  Location: [facility];  Service:      LITHOTRIPSY       VT CYSTOTOMY,EXTRACT &/OR FRAG URETER CALC      Description: Bladder Cystotomy With Basket Extraction Of Calculus;  Recorded: 2012;     VT FRAGMENT KIDNEY STONE/ ESWL Left     Description: Lithotripsy;  Recorded: 2012;  Comments: TIMES TWO.     URETEROSCOPY         Current Medications:     Current Outpatient Medications   Medication Sig     ACCU-CHEK SMARTVIEW TEST STRIP strips TEST 1 TO 2 TIMES DAILY     aspirin 81 MG EC tablet Take 1 tablet (81 mg total) by mouth daily.     blood glucose meter (ONETOUCH ULTRA2) With device kit. Test four times daily Dx: 250     generic lancets Use 1 each As Directed as needed. OneTouch Lancets Miscellaneous. Use four times daily.     metFORMIN (GLUCOPHAGE) 500 MG tablet Take 2 tabs by mouth daily with breakfast and 1 tab daily with supper. Needs appointment before additional refills.     pravastatin (PRAVACHOL) 20 MG tablet Take 1 tablet (20 mg total) by mouth at bedtime.     chlorthalidone (HYGROTEN) 25 MG tablet TAKE 1 TABLET (25 MG TOTAL) BY MOUTH DAILY.       Family History:     Family History   Problem Relation Age of Onset     Heart disease Mother 67        Heart attack     Stroke Father          at 93     Other Sister 15         at 15, two years post surgery for non-union of a leg fracture that never did heal.     Stroke Brother         Mild stroke     Hyperlipidemia Brother      Hypertension Brother      Urolithiasis Brother  "        recurrent     Heart disease Brother 61        \"Heart attack\"     No Medical Problems Daughter      Hypertension Brother      Diabetes Brother      No Medical Problems Daughter        Review of Systems  All other systems reviewed and are negative.         Social History:     Social History     Tobacco Use   Smoking Status Never Smoker   Smokeless Tobacco Never Used     Social History     Patient does not qualify to have social determinant information on file (likely too young).   Social History Narrative    .  Lives with wife.  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  Has 2 adult daughters.           Objective:     /60 (Patient Site: Left Arm, Patient Position: Sitting, Cuff Size: Adult Regular)   Pulse 82   Temp 98  F (36.7  C) (Oral)   Resp 16   Ht 5' 7.44\" (1.713 m)   Wt 183 lb 4.8 oz (83.1 kg)   BMI 28.34 kg/m    Physical Examination: General appearance - alert, well appearing, and in no distress  Eyes: pupils equal and reactive, extraocular eye movements intact  Mouth: mucous membranes moist, pharynx normal without lesions  Neck: supple, no significant adenopathy or thyromegaly  Lungs: clear to auscultation, no wheezes, rales or rhonchi, symmetric air entry  Heart: normal rate, regular rhythm, normal S1, S2, no murmurs.  Abdomen: soft, nontender, nondistended, no masses or organomegaly  Neurological: alert, oriented, normal speech, no focal findings or movement disorder noted.    Extremities: No edema, no clubbing or cyanosis.  Normal monofilament test.    Psychiatric: Normal affect. Does not appear anxious or depressed.    Recent Results (from the past 168 hour(s))   Glycosylated Hemoglobin A1c   Result Value Ref Range    Hemoglobin A1c 7.0 (H) 3.5 - 6.0 %         Paula Recio MD  9/23/2019  9:21 AM        "

## 2021-06-01 NOTE — TELEPHONE ENCOUNTER
RN cannot approve Refill Request    RN can NOT refill this medication PCP messaged that patient is overdue for Labs and Office Visit. Last office visit: Visit date not found Last Physical: Visit date not found Last MTM visit: Visit date not found Last visit same specialty: Visit date not found.  Next visit within 3 mo: Visit date not found  Next physical within 3 mo: Visit date not found      Sheryl Brambila, [facility] [place] Connection Triage/Med Refill 9/9/2019    Requested Prescriptions   Pending Prescriptions Disp Refills     metFORMIN (GLUCOPHAGE) 500 MG tablet [Pharmacy Med Name: METFORMIN  MG TABLET] 270 tablet 2     Sig: TAKE 2 TABLETS BY MOUTH DAILY WITH BREAKFAST AND 1 TABLET DAILY WITH SUPPER.       Metformin Refill Protocol Failed - 9/9/2019 11:03 AM        Failed - LFT or AST or ALT in last 12 months     Albumin   Date Value Ref Range Status   02/06/2019 4.0 3.5 - 5.0 g/dL Final     Bilirubin, Total   Date Value Ref Range Status   10/14/2016 0.5 0.0 - 1.0 mg/dL Final     Alkaline Phosphatase   Date Value Ref Range Status   10/14/2016 55 45 - 120 U/L Final     AST   Date Value Ref Range Status   03/12/2018 19 0 - 40 U/L Final     ALT   Date Value Ref Range Status   10/14/2016 42 0 - 45 U/L Final     Protein, Total   Date Value Ref Range Status   10/14/2016 6.9 6.0 - 8.0 g/dL Final                Failed - Visit with PCP or prescribing provider visit in last 6 months or next 3 months     Last office visit with prescriber/PCP: Visit date not found OR same dept: Visit date not found OR same specialty: Visit date not found Last physical: Visit date not found Last MTM visit: Visit date not found         Next appt within 3 mo: Visit date not found  Next physical within 3 mo: Visit date not found  Prescriber OR PCP: Rishabh Brock DO  Last diagnosis associated with med order: 1. DM type 2 (diabetes mellitus, type 2) (H)  - metFORMIN (GLUCOPHAGE) 500 MG tablet [Pharmacy Med Name: METFORMIN  MG  TABLET]; TAKE 2 TABLETS BY MOUTH DAILY WITH BREAKFAST AND 1 TABLET DAILY WITH SUPPER.  Dispense: 270 tablet; Refill: 2     If protocol passes may refill for 12 months if within 3 months of last provider visit (or a total of 15 months).           Failed - A1C in last 6 months     Hemoglobin A1c   Date Value Ref Range Status   11/12/2018 7.2 (H) 3.5 - 6.0 % Final               Passed - Blood pressure in last 12 months     BP Readings from Last 1 Encounters:   05/20/19 124/64             Passed - GFR or Serum Creatinine in last 6 months     GFR MDRD Non Af Amer   Date Value Ref Range Status   02/06/2019 >60 >60 mL/min/1.73m2 Final     GFR MDRD Af Amer   Date Value Ref Range Status   02/06/2019 >60 >60 mL/min/1.73m2 Final             Passed - Microalbumin in last year      Microalbumin, Random Urine   Date Value Ref Range Status   11/12/2018 <0.50 0.00 - 1.99 mg/dL Final

## 2021-06-01 NOTE — TELEPHONE ENCOUNTER
1 month rx signed, last saw pt in November, 2018 and needs diabetes check up at least once every 3-6 months, which he is overdue.  Please assist pt in scheduling appt.

## 2021-06-01 NOTE — TELEPHONE ENCOUNTER
1 month rx signed. Needs appointment before additional refills.    Please assist in scheduling appt

## 2021-06-01 NOTE — TELEPHONE ENCOUNTER
RN cannot approve Refill Request    RN can NOT refill this medication --> PCP messaged that patient is overdue for Labs (hepatic and A1C).  Last office visit:  11/12/2018.  No pending appt.  Dr Recio to kindly determine extent of refill.  Thank you-    Mayra Frias, [facility] Triage/Med Refill 9/6/2019    Requested Prescriptions   Pending Prescriptions Disp Refills     metFORMIN (GLUCOPHAGE) 500 MG tablet [Pharmacy Med Name: METFORMIN  MG TABLET] 270 tablet 2     Sig: TAKE 2 TABLETS BY MOUTH DAILY WITH BREAKFAST AND 1 TABLET DAILY WITH SUPPER.       Metformin Refill Protocol Failed - 9/6/2019  1:39 AM        Failed - LFT or AST or ALT in last 12 months     Albumin   Date Value Ref Range Status   02/06/2019 4.0 3.5 - 5.0 g/dL Final     Bilirubin, Total   Date Value Ref Range Status   10/14/2016 0.5 0.0 - 1.0 mg/dL Final     Alkaline Phosphatase   Date Value Ref Range Status   10/14/2016 55 45 - 120 U/L Final     AST   Date Value Ref Range Status   03/12/2018 19 0 - 40 U/L Final     ALT   Date Value Ref Range Status   10/14/2016 42 0 - 45 U/L Final     Protein, Total   Date Value Ref Range Status   10/14/2016 6.9 6.0 - 8.0 g/dL Final                Failed - Visit with PCP or prescribing provider visit in last 6 months or next 3 months     Last office visit with prescriber/PCP: Visit date not found OR same dept: Visit date not found OR same specialty: Visit date not found Last physical: Visit date not found Last MTM visit: Visit date not found         Next appt within 3 mo: Visit date not found  Next physical within 3 mo: Visit date not found  Prescriber OR PCP: Rishabh Brock DO  Last diagnosis associated with med order: 1. DM type 2 (diabetes mellitus, type 2) (H)  - metFORMIN (GLUCOPHAGE) 500 MG tablet [Pharmacy Med Name: METFORMIN  MG TABLET]; TAKE 2 TABLETS BY MOUTH DAILY WITH BREAKFAST AND 1 TABLET DAILY WITH SUPPER.  Dispense: 270 tablet; Refill: 2     If protocol passes  may refill for 12 months if within 3 months of last provider visit (or a total of 15 months).           Failed - A1C in last 6 months     Hemoglobin A1c   Date Value Ref Range Status   11/12/2018 7.2 (H) 3.5 - 6.0 % Final               Passed - Blood pressure in last 12 months     BP Readings from Last 1 Encounters:   05/20/19 124/64             Passed - GFR or Serum Creatinine in last 6 months     GFR MDRD Non Af Amer   Date Value Ref Range Status   02/06/2019 >60 >60 mL/min/1.73m2 Final     GFR MDRD Af Amer   Date Value Ref Range Status   02/06/2019 >60 >60 mL/min/1.73m2 Final             Passed - Microalbumin in last year      Microalbumin, Random Urine   Date Value Ref Range Status   11/12/2018 <0.50 0.00 - 1.99 mg/dL Final

## 2021-06-02 ENCOUNTER — RECORDS - HEALTHEAST (OUTPATIENT)
Dept: ADMINISTRATIVE | Facility: CLINIC | Age: 66
End: 2021-06-02

## 2021-06-02 VITALS — BODY MASS INDEX: 28.88 KG/M2 | WEIGHT: 184 LBS | HEIGHT: 67 IN

## 2021-06-03 VITALS
HEART RATE: 82 BPM | SYSTOLIC BLOOD PRESSURE: 118 MMHG | HEIGHT: 67 IN | BODY MASS INDEX: 28.77 KG/M2 | DIASTOLIC BLOOD PRESSURE: 60 MMHG | RESPIRATION RATE: 16 BRPM | TEMPERATURE: 98 F | WEIGHT: 183.3 LBS

## 2021-06-04 VITALS
TEMPERATURE: 98.3 F | DIASTOLIC BLOOD PRESSURE: 68 MMHG | BODY MASS INDEX: 27.28 KG/M2 | HEART RATE: 76 BPM | SYSTOLIC BLOOD PRESSURE: 116 MMHG | HEIGHT: 68 IN | WEIGHT: 180 LBS | RESPIRATION RATE: 16 BRPM

## 2021-06-07 NOTE — TELEPHONE ENCOUNTER
No Px since 11/2018  Had related labs 9/2019,  A1C=7.0  Please Advise    Thank You So Much!!  Benita MANLEY CMA

## 2021-06-08 NOTE — TELEPHONE ENCOUNTER
RN cannot approve Refill Request    RN can NOT refill this medication Protocol failed and NO refill given.      Patience Langford, [facility] Triage/Med Refill 6/15/2020    Requested Prescriptions   Pending Prescriptions Disp Refills     metFORMIN (GLUCOPHAGE) 500 MG tablet [Pharmacy Med Name: METFORMIN  MG TABLET] 270 tablet 3     Sig: TAKE 2 TABLETS BY MOUTH DAILY WITH BREAKFAST AND 1 TABLET DAILY WITH SUPPER.       Metformin Refill Protocol Failed - 6/14/2020 12:31 PM        Failed - LFT or AST or ALT in last 12 months     Albumin   Date Value Ref Range Status   02/06/2019 4.0 3.5 - 5.0 g/dL Final     Bilirubin, Total   Date Value Ref Range Status   10/14/2016 0.5 0.0 - 1.0 mg/dL Final     Alkaline Phosphatase   Date Value Ref Range Status   10/14/2016 55 45 - 120 U/L Final     AST   Date Value Ref Range Status   03/12/2018 19 0 - 40 U/L Final     ALT   Date Value Ref Range Status   10/14/2016 42 0 - 45 U/L Final     Protein, Total   Date Value Ref Range Status   10/14/2016 6.9 6.0 - 8.0 g/dL Final                Failed - Visit with PCP or prescribing provider visit in last 6 months or next 3 months     Last office visit with prescriber/PCP: Visit date not found OR same dept: 9/23/2019 Paula Recio MD OR same specialty: 9/23/2019 Paula Recio MD Last physical: Visit date not found Last MTM visit: Visit date not found         Next appt within 3 mo: Visit date not found  Next physical within 3 mo: Visit date not found  Prescriber OR PCP: Paula Recio MD  Last diagnosis associated with med order: 1. DM type 2 (diabetes mellitus, type 2) (H)  - metFORMIN (GLUCOPHAGE) 500 MG tablet [Pharmacy Med Name: METFORMIN  MG TABLET]; TAKE 2 TABLETS BY MOUTH DAILY WITH BREAKFAST AND 1 TABLET DAILY WITH SUPPER.  Dispense: 270 tablet; Refill: 0     If protocol passes may refill for 12 months if within 3 months of last provider visit (or a total of 15 months).           Failed - A1C in last 6 months      Hemoglobin A1c   Date Value Ref Range Status   09/23/2019 7.0 (H) 3.5 - 6.0 % Final               Failed - Microalbumin in last year      Microalbumin, Random Urine   Date Value Ref Range Status   11/12/2018 <0.50 0.00 - 1.99 mg/dL Final                  Passed - Blood pressure in last 12 months     BP Readings from Last 1 Encounters:   09/23/19 118/60             Passed - GFR or Serum Creatinine in last 6 months     GFR MDRD Non Af Amer   Date Value Ref Range Status   09/23/2019 >60 >60 mL/min/1.73m2 Final     GFR MDRD Af Amer   Date Value Ref Range Status   09/23/2019 >60 >60 mL/min/1.73m2 Final

## 2021-06-08 NOTE — TELEPHONE ENCOUNTER
Pt due for DM check. Please assist patient in scheduling VV appointment and lab check before I fill rx.

## 2021-06-09 NOTE — PROGRESS NOTES
"2017     /68 (Patient Site: Right Arm, Patient Position: Sitting, Cuff Size: Adult Regular)  Pulse 78  Resp 12  Wt 189 lb 12.8 oz (86.1 kg)  SpO2 98%  BMI 27.23 kg/m2    Past Medical History:   Diagnosis Date     Anxiety      Depression      GERD (gastroesophageal reflux disease)      Hypercholesterolemia      Hypertension      Kidney stone      Overweight      PONV (postoperative nausea and vomiting)      Type 2 diabetes mellitus        Social History     Social History     Marital status:      Spouse name: N/A     Number of children: N/A     Years of education: N/A     Occupational History     owns a transcription company [employer]     Social History Main Topics     Smoking status: Never Smoker     Smokeless tobacco: Never Used     Alcohol use Yes      Comment: Less than weekly.     Drug use: No     Sexual activity: Yes     Partners: Female     Other Topics Concern     Not on file     Social History Narrative       Family History   Problem Relation Age of Onset     Heart disease Mother 67     Heart attack     Stroke Father       at 93     Other Sister 15      at 15, two years post surgery for non-union of a leg fracture that never did heal.     Stroke Brother      Mild stroke     Hyperlipidemia Brother      Hypertension Brother      Urolithiasis Brother      recurrent     Heart disease Brother 61     \"Heart attack\"     No Medical Problems Daughter      Hypertension Brother      Diabetes Brother      No Medical Problems Daughter        As part of this visit I have today personally reviewed past medical history, family medical history, social history (specifically including tobacco use), current medications and intolerances/allergies.  I have updated and/or or corrected these areas of history as may have been appropriate.    Allergies   Allergen Reactions     Dilaudid [Hydromorphone] Itching       Current Outpatient Prescriptions   Medication Sig Note     blood glucose test " "(GLUCOSE BLOOD) strips OneTouch Ultra Blue In Vitro Strip. Test 1-2 times daily.      blood-glucose meter (ONE TOUCH ULTRA 2) kit With device kit. Test four times daily Dx: 250      chlorthalidone (HYGROTEN) 25 MG tablet TAKE 1 TABLET BY MOUTH DAILY.      FERROUS FUMARATE (IRON ORAL) Take by mouth. 10/14/2016: Patient started this on his own because he was \"feeling fatigued and tired.\"  10/14/2016      generic lancets Use 1 each As Directed as needed. OneTouch Lancets Miscellaneous. Use four times daily.      metFORMIN (GLUCOPHAGE) 500 MG tablet TAKE 1 TABLET BY MOUTH EVERY EVENING      pravastatin (PRAVACHOL) 20 MG tablet Take 1 tablet (20 mg total) by mouth bedtime.      aspirin 81 MG EC tablet Take 1 tablet (81 mg total) by mouth daily.      lisinopril (ZESTRIL) 2.5 MG tablet Take 1 daily to protect your kidneys from diabetes. 4/5/2017: He has not started this.  It was prescribed for his diabetes.  He has not been convinced that it is needed.  4/5/2017        Patient Active Problem List   Diagnosis     Hypercholesterolemia     Anxiety Disorder NOS     DM - Type 2 diabetes     Overweight (BMI 25.0-29.9)     Screening for prostate cancer     Hypercalciuria     Other nonspecific finding on examination of urine     Anemia     Leukocytosis     Calculus of kidney       The following high BMI interventions were performed this visit: weight monitoring .  I encouraged weight loss as a way to help control his blood sugars.    Diabetes -most recent A1c was satisfactory.  I will update this.    Lab Results   Component Value Date    HGBA1C 6.5 (H) 10/14/2016     Lab Results   Component Value Date    MICROALBUR <0.50 10/14/2016       Hypovitaminosis D -most recent vitamin D was low.  I will update.  Vitamin D, Total (25-Hydroxy)   Date Value Ref Range Status   09/12/2011 20.7 (L) 30.0 - 80.0 ng/mL Final     Comment:                     Deficiency              <10.0 ng/mL               Insufficiency       10.0-29.9 ng/mL        "        Sufficiency         30.0-80.0 ng/mL               Toxicity (possible)    >100.0 ng/mL       Lipid status -he is on pravastatin.  He has a low HDL cholesterol.  Lab Results   Component Value Date    CHOL 142 10/27/2016    CHOL 169 01/19/2015    CHOL 207 (H) 09/10/2014     Lab Results   Component Value Date    HDL 39 (L) 10/27/2016    HDL 39 (L) 01/19/2015    HDL 41 09/10/2014     Lab Results   Component Value Date    LDLCALC 84 10/27/2016    LDLCALC 105 01/19/2015    LDLCALC 144 (H) 09/10/2014     Lab Results   Component Value Date    TRIG 93 10/27/2016    TRIG 126 01/19/2015    TRIG 108 09/10/2014     No components found for: CHOLHDL    Thyroid status -normal TSH within 5 years.  He is not on levothyroxine  Lab Results   Component Value Date    TSH 2.74 03/09/2016       CBC monitoring -when last checked, he was mildly anemic.  This was a normocytic anemia.  I will update this.  Lab Results   Component Value Date    WBC 6.3 10/14/2016    HGB 13.3 (L) 10/14/2016    HCT 39.1 (L) 10/14/2016    MCV 84 10/14/2016     10/14/2016       CMP review -most recent CMP was completely normal.  Results for orders placed or performed in visit on 10/14/16   Comprehensive Metabolic Panel   Result Value Ref Range    Sodium 140 136 - 145 mmol/L    Potassium 3.6 3.5 - 5.0 mmol/L    Chloride 99 98 - 107 mmol/L    CO2 29 22 - 31 mmol/L    Anion Gap, Calculation 12 5 - 18 mmol/L    Glucose 105 70 - 125 mg/dL    BUN 9 8 - 22 mg/dL    Creatinine 0.82 0.70 - 1.30 mg/dL    GFR MDRD Af Amer >60 >60 mL/min/1.73m2    GFR MDRD Non Af Amer >60 >60 mL/min/1.73m2    Bilirubin, Total 0.5 0.0 - 1.0 mg/dL    Calcium 9.8 8.5 - 10.5 mg/dL    Protein, Total 6.9 6.0 - 8.0 g/dL    Albumin 4.1 3.5 - 5.0 g/dL    Alkaline Phosphatase 55 45 - 120 U/L    AST 28 0 - 40 U/L    ALT 42 0 - 45 U/L     Chief complaint: Dry mouth    Present illness: Is dry mouth has been intrusive, for the past few months.  He is truly annoyed by this.  I will check for the  "usual screening.  I considered the possibility of Sjogren's syndrome as either a primary or secondary phenomenon.  I told him that chlorthalidone could be causing dry mouth.  I ordered appropriate studies.  I discussed the possibility of a minor salivary gland biopsy.  There is no pain associated with a dry mouth.  He does not know of anything which clearly makes it better or worse.    Type 2 diabetes -he is using a powder from the [place] called  \"neem.\"  I tried to find information about this product and Google, but could not readily find something that appeared likely to be helpful.  He states that his fasting blood sugars are around 130 without neem.  110 with neem.  He increased his metformin from 500 to 1000 at supper.  Encouraged to call and ask about med changes.  I told him that a change in medication, even if his wife is taking that amount of metformin, is probably not a good idea without checking it out first.  It is conceivable that a drug that he might choose to change could get him into trouble, although this is, of course, not likely with metformin and the dose that he currently employees.  I told him that I have no problem with his taking neem.    Tired - never got the CBC or the iron studies.  I discussed that low iron can cause fatigue.  I discussed that low iron can cause iron deficiency anemia, which can cause fatigue.  I would consider the possibility of sleep apnea if I do not get any useful information from the studies that were ordered.    Physical examination  General this is a 61-year-old [place] American who is overweight, but not obese.  No additional examination today.      Impression    1.  Xerostomia, a problem which is relatively new, and bothers him a great deal.  2.  Type 2 diabetes  3.  Tiredness and fatigue, without following through and getting the recommended iron studies.    Plan    1.  Lab studies today, including the iron studies ordered previously.  2.  Research " in up-to-date regarding medications that are on his program that could cause xerostomia.  I think that the chlorthalidone is a potential culprit.  3.  Discontinue chlorthalidone and see if either the tiredness/fatigue or dry mouth go away.  4.  Discussion regarding metformin.  5.  Return visit in 3 months.  6.  Hold off on starting lisinopril.  I prescribed it earlier at 2.5 mg daily, but he was reluctant to and therefore did not start it.  I discussed this drug with him.  This is not likely to cause dry mouth according to up-to-date.        Much or all of the text in this note was generated through the use of Dragon Dictate voice-to-text software.  Errors in spelling or words which seem out of context are unintentional.  Dragon has a significant issue with pronouns and, of course, homonyms.  Errors with words of this sort may escape editing.       Patient Instructions   1. Chlorthalidone can cause dry mouth.     2.  Chlorthalidone helps to prevent kidney stones.    3.  I suggest that you skip the chlorthalidone for a couple of weeks and see if the dry mouth goes away.  It if does, you have the answer for your dry mouth.     The problem is - you also no longer have the kidney-stone preventer.    4.  So, lets see if tiredness and dry mouth go away.     If they do - you and your kidney stone doctor will need to have a heart-to-heart talk.    5.  Hold off on starting the lisinopril.    6.  Go to the lab today for blood tests.    7.  Come back and see me in three months.    8.  I will send you an e-mail about your labs.    9.  Continue to take the 1000 mg of metformin.  If you prefer, you may take this as 500 mg breakfast and supper.  I'd wait for the A1c result first.

## 2021-06-09 NOTE — TELEPHONE ENCOUNTER
"----- Message from Altaf Salamanca MD sent at 7/7/2020  5:36 PM CDT -----  Please call the patient with the following message and let them know the message is available on [facility]anzila as well. If unable to reach, please leave voicemail directing them to their MyChart.     \"Harman Pantoja,    Your COVID antibody test came back negative.    Let me know if you have questions or concerns,  Dr. Beauchamp\"        "

## 2021-06-09 NOTE — PROGRESS NOTES
"Kit Martin is a 64 y.o. male who is being evaluated via a billable video visit.      The patient has been notified of following:     \"This video visit will be conducted via a call between you and your physician/provider. We have found that certain health care needs can be provided without the need for an in-person physical exam.  This service lets us provide the care you need with a video conversation.  If a prescription is necessary we can send it directly to your pharmacy.  If lab work is needed we can place an order for that and you can then stop by our lab to have the test done at a later time.    Video visits are billed at different rates depending on your insurance coverage. Please reach out to your insurance provider with any questions.    If during the course of the call the physician/provider feels a video visit is not appropriate, you will not be charged for this service.\"    Patient has given verbal consent to a Video visit? Yes    Will anyone else be joining your video visit? No    Patient was not logged in at 3:20pm for appointment. I called patient at 3:21pm, and he tells me that because this visit would cost him $45 he does not want to participate in a video visit.  I told him that was fine, and he said he would be willing to see me for an in person visit next week when I am doing face-to-face visits, however complained and asked why he needed to come in even though he [place] had his lab checked.  Informed patient that for diabetes check we need to see patients every 3 to 6 months, and I haven't seen patient since September 2019.  Patient became upset and said \"Alexey Alvares\" and hung up on me.  CA will call patient later this afternoon to try to schedule F2F visit.  This is a no-charge visit, as no margoal done.    "

## 2021-06-13 NOTE — PROGRESS NOTES
Assessment:     1. Routine general medical examination at a health care facility     2. Hypercholesterolemia  Lipid Cascade   3. Diabetes mellitus  Glycosylated Hemoglobin A1c    Microalbumin, Random Urine    Glycosylated Hemoglobin A1c   4. Flu vaccine need         The following high BMI interventions were performed this visit: encouragement to exercise and dietary management education, guidance, and counseling     Plan:      1. Routine general medical examination at a health care facility    2. Hypercholesterolemia  Check cholesterol fasting labs today  - Lipid Cascade    3. Diabetes mellitus  Check labs below  - Glycosylated Hemoglobin A1c  - Microalbumin, Random Urine  - Glycosylated Hemoglobin A1c    4. Flu vaccine need  I have discussed the risks and benefits of all of the vaccine components with the patient.  All questions have been answered.      Subjective:      Kit Martin is a 62 y.o. male, new to me, who presents for an annual exam. The patient reports that there is not domestic violence in his life. Doesn't need refills of medications today.    Has a history of DM Type II, doesn't regularly check sugars.  Last A1c was 6.5 in October 2016.  He is fasting today.      Healthy Habits:   Regular Exercise: No  Sunscreen Use: No  Healthy Diet: Yes  Dental Visits Regularly: No  Seat Belt: Yes  Sexually active: No  Monthly Self Testicular Exams:  Yes  Hemoccults: No  Flex Sig: No  Colonoscopy: Yes, 2014  Lipid Profile: fasting today  Glucose Screen: fasting today  Prevention of Osteoporosis: No  Last Dexa: N/A        Immunization History   Administered Date(s) Administered     Influenza, inj, historic 10/09/2014     Influenza, seasonal,quad inj 36+ mos 10/14/2016     Influenza, seasonal,quad inj 6-35 mos 10/09/2014     Td, historic 09/18/2007, 10/26/2009     Tdap 10/14/2016     Immunization status: missing doses of Zostavax, declines today.    No exam data present    Current Outpatient Prescriptions    Medication Sig Dispense Refill     ACCU-CHEK SMARTVIEW TEST STRIP strips TEST 1 TO 2 TIMES DAILY 100 strip 1     aspirin 81 MG EC tablet Take 1 tablet (81 mg total) by mouth daily.  0     chlorthalidone (HYGROTEN) 25 MG tablet TAKE 1 TABLET BY MOUTH DAILY. 90 tablet 0     metFORMIN (GLUCOPHAGE) 500 MG tablet TAKE ONE TABLET BY MOUTH TWICE DAILY 180 tablet 1     pravastatin (PRAVACHOL) 20 MG tablet Take 1 tablet (20 mg total) by mouth at bedtime. 90 tablet 3     blood-glucose meter (ONE TOUCH ULTRA 2) kit With device kit. Test four times daily Dx: 250       FERROUS FUMARATE (IRON ORAL) Take by mouth.       generic lancets Use 1 each As Directed as needed. OneTouch Lancets Miscellaneous. Use four times daily. 100 each 11     UNABLE TO FIND Med Name:  Neem, a dry powder, small amount, which seems to reduce his sugar. Herbal remedy from [facility].       No current facility-administered medications for this visit.      Past Medical History:   Diagnosis Date     Anxiety      Depression      GERD (gastroesophageal reflux disease)      Hypercholesterolemia      Hypertension      Kidney stone      Overweight      PONV (postoperative nausea and vomiting)      Type 2 diabetes mellitus      Past Surgical History:   Procedure Laterality Date     CYSTOSCOPY W/ URETERAL STENT PLACEMENT Bilateral 11/3/2015    Procedure: CYSTOSCOPY, BILATERAL URETEROSCOPY, LASER LITHOTRIPSY, STENT INSERTION;  Surgeon: Herman Lucia MD;  Location: [facility];  Service:      LITHOTRIPSY       [place] CYSTOTOMY,EXTRACT &/OR FRAG URETER CALC      Description: Bladder Cystotomy With Basket Extraction Of Calculus;  Recorded: 2012;     [place] FRAGMENT KIDNEY STONE/ ESWL Left     Description: Lithotripsy;  Recorded: 2012;  Comments: TIMES TWO.     URETEROSCOPY       Dilaudid [hydromorphone]  Family History   Problem Relation Age of Onset     Heart disease Mother 67     Heart attack     Stroke Father       at 93     Other Sister 15       "at 15, two years post surgery for non-union of a leg fracture that never did heal.     Stroke Brother      Mild stroke     Hyperlipidemia Brother      Hypertension Brother      Urolithiasis Brother      recurrent     Heart disease Brother 61     \"Heart attack\"     No Medical Problems Daughter      Hypertension Brother      Diabetes Brother      No Medical Problems Daughter      Social History     Social History     Marital status:      Spouse name: N/A     Number of children: N/A     Years of education: N/A     Occupational History     owns a transcription company [employer]     Social History Main Topics     Smoking status: Never Smoker     Smokeless tobacco: Never Used     Alcohol use Yes      Comment: Less than weekly.     Drug use: No     Sexual activity: Yes     Partners: Female     Other Topics Concern     Not on file     Social History Narrative    .  Lives with wife.  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  Has 2 adult daughters.         Review of Systems  General:  Denies problem  Eyes: Denies problem  Ears/Nose/Throat: Denies problem  Cardiovascular: Denies problem  Respiratory:  Denies problem  Gastrointestinal:  Denies problem  Genitourinary: Denies problem  Musculoskeletal:  Denies problem  Skin: Denies problem  Neurologic: Denies problem  Psychiatric: Denies problem  Endocrine: Denies problem  Heme/Lymphatic: Denies problem   Allergic/Immunologic: Denies problem        Objective:     Vitals:    10/27/17 0756   BP: 122/74   Pulse: 84   Resp: (!) 6   Weight: 186 lb 4.8 oz (84.5 kg)   Height: 5' 7.5\" (1.715 m)     Body mass index is 28.75 kg/(m^2).    Physical  General Appearance: Alert, cooperative, no distress, appears stated age  Head: Normocephalic, without obvious abnormality, atraumatic  Eyes: PERRL, conjunctiva/corneas clear, EOM's intact  Ears: Normal TM's and external ear canals, both ears  Nose: Nares normal, septum midline,mucosa normal, no drainage  Throat: Lips, mucosa, and " tongue normal; teeth and gums normal  Neck: Supple, symmetrical, trachea midline, no adenopathy;  thyroid: not enlarged, symmetric, no tenderness/mass/nodules; no carotid bruit or JVD  Lungs: Clear to auscultation bilaterally, respirations unlabored  Heart: Regular rate and rhythm, S1 and S2 normal, no murmur.  Abdomen: Soft, non-tender, bowel sounds active all four quadrants,  no masses, no organomegaly  Musculoskeletal: Normal range of motion. No joint swelling or deformity.   Extremities: Extremities normal, atraumatic, no cyanosis or edema  Skin: Skin color, texture, turgor normal, no rashes or lesions  Lymph nodes: Cervical, supraclavicular, and axillary nodes normal  Neurologic: Alert.  Normal speech.  No focal deficits.  Normal deep tendon reflexes.   Psychiatric: Normal mood and affect.  Does not appear anxious or depressed.         Paula Recio MD  10/27/2017

## 2021-06-15 NOTE — PROGRESS NOTES
Assessment/Plan:        Diagnoses and all orders for this visit:    Calculus of kidney  -     CT Abdomen Pelvis Without Oral Without IV Contrast; Future; Expected date: 2/4/19  -     Patient Stated Goal: Prevent further stones    Hypercalciuria  -     Renal Function Profile; Future; Expected date: 2/4/19  -     Stone Formation, 24 Hour Urine x1; Future; Expected date: 2/4/19  -     24 Hour Urine Collection Steps Education  -     Chlorthalidone Education    Hypocitraturia  -     Stone Formation, 24 Hour Urine x1; Future; Expected date: 2/4/19  -     24 Hour Urine Collection Steps Education  -     Potassium Citrate Education  -     potassium citrate (UROCIT-K) 10 mEq (1,080 mg) SR tablet; Take 2 tablets (20 mEq total) by mouth daily with breakfast.  Dispense: 180 tablet; Refill: 4    Hyperoxaluria  -     Stone Formation, 24 Hour Urine x1; Future; Expected date: 2/4/19  -     24 Hour Urine Collection Steps Education    Urinary tract stones  -     Urinalysis Macroscopic    Hypertension, unspecified type  -     chlorthalidone (HYGROTEN) 25 MG tablet; Take 1 tablet (25 mg total) by mouth daily.  Dispense: 90 tablet; Refill: 4      Stone Management Plan  [facility] Stone Management 7/13/2016 9/19/2016 2/5/2018   Urinary Tract Infection No suspicion of infection No suspicion of infection No suspicion of infection   Renal Colic Asymptomatic at this time Asymptomatic at this time Asymptomatic at this time   Renal Failure No suspicion of renal failure No suspicion of renal failure No suspicion of renal failure   Current CT date 7/13/2016 - 2/5/2018   Right sided stones? No - Yes   R Number of ureteral stones - - No ureteral stones   R Number of kidney stones  - - 2   R GSD of kidney stones - - 2 - 4   R Hydronephrosis - - None   R Stone Event No current event No current event No current event   Diagnosis date - - -   Initial location of primary symptomatic stone - - -   Initial GSD of primary symptomatic stone - - -   Resolved date -  - -   R Post-op status - - -   R Current Plan - - Observe   Clear rationale - - -   Observe rationale - - Limited stone burden with good prognosis for spontaneous passage   Left sided stones? Yes No Yes   L Number of ureteral stones No ureteral stones - No ureteral stones   L Number of kidney stones  1 - 3   L GSD of kidney stones 2 - 4 - 4 - 10   L Hydronephrosis None - None   L Stone Event No current event No current event No current event   Diagnosis date - - -   Initial location of primary symptomatic stone - - -   Initial GSD of primary symptomatic stone - - -   Resolved date - - -   Post-op status - - -   L Current Plan Observe Observe Observe   Clear rationale - - -   Observe rationale Limited stone burden with good prognosis for spontaneous passage Limited stone burden with good prognosis for spontaneous passage Limited stone burden with good prognosis for spontaneous passage         Subjective:      HPI  Mr. Kit Martin is a 62 y.o. [place] male returning to the [facility] [place] for long-term stone management.     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 He has previously participated in stone risk evaluation and remains actively engaged in stone risk reduction. He has identified modifiable stone risks including:  thiazide responsive hypercalciuria, high urine sodium, hypocitraturia, dietary hyperoxaluria, type II diabetes and high sodium diet. He has identified non-modifiable stone risks including:  limited family history, multiple stones at presentation and bilateral stones.     He has done well since last encounter without any stone events. He is asymptomatic at present. He denies symptoms of fever, chills, flank pain, nausea, vomiting, urinary frequency and dysuria.    When asked, he states that he has had challenges adhering to risk reduction strategy but has maintained his effort. He rates  his overall success as generally good. He has been taking medications as prescribed and is tolerating well.     One 24 hour urine collection demonstrates good urine volume (2100 mL), creatinine (1588 mg), mild hypercalciuria (290 mg, previously ~400's), moderate high urine sodium (191 mmol), mild hypocitraturia (321 mg), and mild hyperoxaluria (45 mg). Dietary journal is not available for review.     New CT scan is personally reviewed and demonstrates progression of stone disease. There are two new right renal calculi, largest 3-4 mm. There are three left lower pole renal calculi, largest 6 mm. No ureteral stones. No hydronephrosis.     PLAN    61 yo [place] M with hx of rapidly recurrent calcium based stone disease. Current stone risk factors of thiazide responsive hypercalciuria, hypocitraturia, high urine sodium, and dietary hyperoxaluria. Bilateral non-obstructing nephrolithiasis.    Will continue current strategy and return to clinic in 12 months with imaging, 24 hour urine and updated renal panel. Will continue chlorthalidone 25 mg daily and re-institute potassium citrate 20 mEq daily. He will continue to push fluids, restrict sodium and oxalate diet sources.    Patient also seen and examined by Melba Duckworth PA-C       ROS   Review of systems is negative except for HPI.    Past Medical History:   Diagnosis Date     Anxiety      Depression      GERD (gastroesophageal reflux disease)      Hypercholesterolemia      Hypertension      Kidney stone      Overweight      PONV (postoperative nausea and vomiting)      Type 2 diabetes mellitus        Past Surgical History:   Procedure Laterality Date     CYSTOSCOPY W/ URETERAL STENT PLACEMENT Bilateral 11/3/2015    Procedure: CYSTOSCOPY, BILATERAL URETEROSCOPY, LASER LITHOTRIPSY, STENT INSERTION;  Surgeon: Herman Lucia MD;  Location: [facility];  Service:      LITHOTRIPSY       [place] CYSTOTOMY,EXTRACT &/OR FRAG URETER CALC      Description: Bladder Cystotomy  With Basket Extraction Of Calculus;  Recorded: 02/01/2012;     [place] FRAGMENT KIDNEY STONE/ ESWL Left     Description: Lithotripsy;  Recorded: 02/01/2012;  Comments: TIMES TWO.     URETEROSCOPY         Current Outpatient Prescriptions   Medication Sig Dispense Refill     ACCU-CHEK SMARTVIEW TEST STRIP strips TEST 1 TO 2 TIMES DAILY 100 strip 1     aspirin 81 MG EC tablet Take 1 tablet (81 mg total) by mouth daily.  0     blood-glucose meter (ONE TOUCH ULTRA 2) kit With device kit. Test four times daily Dx: 250       chlorthalidone (HYGROTEN) 25 MG tablet TAKE 1 TABLET BY MOUTH DAILY.  Needs labs checked before additional refills. 30 tablet 0     generic lancets Use 1 each As Directed as needed. OneTouch Lancets Miscellaneous. Use four times daily. 100 each 11     metFORMIN (GLUCOPHAGE) 500 MG tablet TAKE ONE TABLET BY MOUTH TWICE DAILY 180 tablet 0     pravastatin (PRAVACHOL) 20 MG tablet Take 1 tablet (20 mg total) by mouth at bedtime. 90 tablet 3     FERROUS FUMARATE (IRON ORAL) Take by mouth.       UNABLE TO FIND Med Name:  Neem, a dry powder, small amount, which seems to reduce his sugar. Herbal remedy from [facility].       No current facility-administered medications for this visit.        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     Drug use: No     Sexual activity: Yes     Partners: Female     Other Topics Concern     Not on file     Social History Narrative    .  Lives with wife.  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  Has 2 adult daughters.         Family History   Problem Relation Age of Onset     Heart disease Mother  "67     Heart attack     Stroke Father       at 93     Other Sister 15      at 15, two years post surgery for non-union of a leg fracture that never did heal.     Stroke Brother      Mild stroke     Hyperlipidemia Brother      Hypertension Brother      Urolithiasis Brother      recurrent     Heart disease Brother 61     \"Heart attack\"     No Medical Problems Daughter      Hypertension Brother      Diabetes Brother      No Medical Problems Daughter      Objective:      Physical Exam  Vitals:    18 0829   BP: 132/66   Pulse: 78   Temp: 97.6  F (36.4  C)     General - well developed, well nourished, appropriate for age. Appears no distress at this time  Abdomen - mildly obese soft, non-tender, no hepatosplenomegaly, no masses.   - no flank tenderness, no suprapubic tenderness, kidney and bladder non-palpable  MSK - normal spinal curvature. no spinal tenderness. normal gait. muscular strength intact.  Psych - oriented to time, place, and person, normal mood and affect.      Labs   Urinalysis POC (Office):  Blood UA   Date Value Ref Range Status   2016 Trace Negative Final   2016 Negative Negative Final   2016 Negative Negative Final     Nitrite, UA   Date Value Ref Range Status   2018 Negative Negative Final   10/14/2016 Negative Negative Final   2016 Negative Negative Final   2016 Negative Negative Final   2016 Negative Negative Final     Leukocytes, UA    Date Value Ref Range Status   2016 Negative Negative Final   2016 Negative Negative Final   2016 Negative Negative Final     pH UA   Date Value Ref Range Status   2016 6.5 4.5 - 8.0 Final   2016 7.0 4.5 - 8.0 Final   2016 6.5 4.5 - 8.0 Final       Lab Urinalysis:  Blood, UA   Date Value Ref Range Status   2018 Negative Negative Final   10/14/2016 Trace (!) Negative Final   2016 Negative Negative Final     Nitrite, UA   Date Value Ref Range Status   2018 " Negative Negative Final   10/14/2016 Negative Negative Final   09/19/2016 Negative Negative Final   07/13/2016 Negative Negative Final   03/09/2016 Negative Negative Final     Leukocytes, UA   Date Value Ref Range Status   02/05/2018 Negative Negative Final   10/14/2016 Negative Negative Final   03/09/2016 Negative Negative Final     pH, UA   Date Value Ref Range Status   02/05/2018 7.0 5.0 - 8.0 Final   10/14/2016 7.0 4.5 - 8.0 Final   03/09/2016 7.0 4.5 - 8.0 Final    and Stone prevention labs   24 hour urine   Creatinine, 24 Hour Urine   Date Value Ref Range Status   03/02/2012 1241.2 1000.0 - 2000.0 mg/24Hr Final   01/10/2012 1008.9 1000.0 - 2000.0 mg/24Hr Final   11/01/2011 1749.0 1000.0 - 2000.0 mg/24Hr Final     Calcium, 24H Urine   Date Value Ref Range Status   01/08/2018 290 25 - 300 mg/24hr Final     Comment:       Hypercalciuria >350  Values are for persons with  average daily calcium intake  (600-800 mg/day)   08/29/2016 455 (H) 25 - 300 mg/24hr Final     Comment:       Hypercalciuria >350  Values are for persons with  average daily calcium intake  (600-800 mg/day)   07/02/2016 428 (H) 25 - 300 mg/24hr Final     Comment:       Hypercalciuria >350  Values are for persons with  average daily calcium intake  (600-800 mg/day)     Sodium, 24 Hour Urine   Date Value Ref Range Status   01/08/2018 191 40 - 217 mmol/24hr Final   08/29/2016 255 (H) 40 - 217 mmol/24hr Final   07/02/2016 275 (H) 40 - 217 mmol/24hr Final     Citrate, 24 Hour Urine   Date Value Ref Range Status   01/08/2018 321 (L) 434 - 1191 mg/24hr Final     Comment:       Reference Ranges are not  established for 0-19 years  or >60 years of age.   08/29/2016 499 427 - 1191 mg/24hr Final     Comment:       Reference Ranges are not  established for 0-19 years  or >60 years of age.   07/02/2016 715 427 - 1191 mg/24hr Final     Comment:       Reference Ranges are not  established for 0-19 years  or >60 years of age.     Oxalate, 24 Hour Urine   Date  Value Ref Range Status   01/08/2018 45.4 (H) 7.0 - 44.0 mg/24hr Final   08/29/2016 37.2 7.0 - 44.0 mg/24hr Final   07/02/2016 52.5 (H) 7.0 - 44.0 mg/24hr Final     pH, Urine   Date Value Ref Range Status   01/08/2018 7.0 4.5 - 8.0 Final   08/29/2016 6.5 4.5 - 8.0 Final   07/02/2016 7.0 4.5 - 8.0 Final     Urine Volume   Date Value Ref Range Status   01/08/2018 2100 mL Final   08/29/2016 2600 mL Final   07/02/2016 2475 mL Final

## 2021-06-17 NOTE — TELEPHONE ENCOUNTER
3 month rx signed. Needs appointment before additional refills.    Please let pt know he'll be due for physical around that time

## 2021-06-17 NOTE — TELEPHONE ENCOUNTER
Pt calling for refill - chlorthalidone    Reviewed MyChart Message response from PCP with Patient.    Schedule PHY Appt with PCP for 07/08/21 - advised pt of the location and address.

## 2021-06-18 NOTE — PATIENT INSTRUCTIONS - HE
Patient Instructions by Altaf Salamanca MD at 7/3/2020 11:20 AM     Author: Altaf Salamanca MD Service: -- Author Type: Physician    Filed: 7/3/2020 12:18 PM Encounter Date: 7/3/2020 Status: Signed    : Altaf Salamanca MD (Physician)           Preventing Skin Cancer     Use sunscreen of SPF 30 or greater. Apply liberally.   Relaxing in the sun may feel good. But it isnt good for your skin. In fact, the suns harmful rays are the major cause of skin cancer. This is a serious disease that can be life-threatening. People of all ages, races, and backgrounds are at risk.  Skin cancer is the most common cancer in the [place] But in most cases, it can be prevented.  Your role in prevention  You can act today to help prevent skin cancer. Start by avoiding the suns UV (ultraviolet) rays. And dont use tanning beds or lamps. They are no safer than the sun. Taking these steps can help keep you from getting skin cancer. It can also help prevent wrinkles and other aging effects caused by the sun. Make sure your children also follow these safeguards. Now is the time to start taking steps to prevent skin cancer.  When you are outdoors  Protect your skin when you go out during the day. Take safety steps whenever you go out to eat, run errands by car or on foot, or do any outdoor activity. There isnt just one easy way to protect your skin. Its best to follow all of these steps:    Wear tightly woven clothing that covers your skin. Put on a wide-brimmed hat to protect your face, ears, and scalp.    Watch the clock. Try to stay out of the sun between 10 a.m. and 4 p.m. That's when the sun's rays are strongest.    Head for the shade or create your own. Use an umbrella when sitting or strolling.    Know that the suns rays can reflect off sand, water, and snow. This can harm your skin. Take extra care when you are near reflective surfaces.    Keep in mind that even when the weather is hazy or cloudy,  "your skin can be exposed to strong UV rays.    Shield your skin with sunscreen. Also use sunscreen on your childrens skin. Keep babies younger than 6 months old out of the sun.  Tips for using sunscreen  To help prevent skin cancer, choose the right sunscreen and use it correctly. Try these tips:    Choose a sunscreen that has an SPF (sun protection factor) of at least 30. Also choose a sunscreen labeled \"broad spectrum. This will protect you from both UVA and UVB (ultraviolet A and B) rays.    If one brand irritates your skin, try another, such as one without fragrance.    Use a water-resistant sunscreen if you swim or sweat.    Use at least 1 ounce of sunscreen to cover exposed areas. This is enough to fill a shot glass. You might need to adjust the amount depending on your body size.    Put the sunscreen on dry skin about 15 minutes before going outdoors. This gives it time to soak in.    Reapply sunscreen every 2 hours. If youre active, do this more often.    Cover any sun-exposed skin, from your face to your feet. Dont forget your scalp, ears, and lips.    Know that while sunscreen helps protect you, it isnt enough. Sunscreens extend the length of time you can be outdoors before your skin starts to get red. But they don't give you total protection. Using sunscreen doesn't mean you can stay out in the sun for an unlimited time. Your skin cells are still being damaged. You should also wear protective clothing. And try to stay out of the sun as much as you can, especially from 10 a.m. to 4 p.m.  Date Last Reviewed: 7/1/2019 2000-2019 [employer]. [address] [place] [address], [place] [address]. All rights reserved. This information is not intended as a substitute for professional medical care. Always follow your healthcare professional's instructions.             "

## 2021-06-20 NOTE — LETTER
Letter by Severson, Tammie F, LPN at      Author: Severson, Tammie F, LPN Service: -- Author Type: --    Filed:  Encounter Date: 7/9/2020 Status: (Other)       7/9/2020        Kit Martin  [address] [place] [address]    COVID-19 Antibody Screen   Date Value Ref Range Status   07/03/2020 Negative  Final     Comment:     No COVID-19 antibodies detected.  Patients within 10 days of symptom onset for  COVID-19 may not produce sufficient levels of detectable antibodies.  Immunocompromised COVID-19 patients may take longer to develop antibodies.     COVID-19 IgG Titer   Date Value Ref Range Status   07/03/2020 Not Applicable  Final     Comment:     Qualitative screen for total antibodies to COVID-19 (SARS-CoV-2) with  semi-quantitative measurement of IgG COVID-19 antibodies by endpoint titer.  COVID-19 antibodies may be elevated due to a past or current infection.  Negative results do not rule out COVID-19 infection.  Results from antibody  testing should not be used as the sole basis to diagnose or exclude SARS-CoV-2  infection or to inform infection status.  COVID-19 PCR test should be ordered  if current infection is suspected.  False positive results may occur in rare  cases due to cross-reacting antibodies.  This test was developed and its performance characteristics determined by the  [facility] ([facility]),  which is regulated under CLIA as qualified to perform high-complexity testing.  This test has not been reviewed by the FDA.  Testing performed by [facility], [facility], [address], [address], [place] [address]       No results found for: [id number]    You have tested NEGATIVE for COVID-19 antibodies. This suggests you have not had or been exposed to COVID-19. But it does not mean that for sure.    The test finds antibodies in most people 10 days after they get sick. For some people,  it takes longer than 10 days for antibodies to show up. Others may never show antibodies against COVID-19, especially if they have weak immune systems.    If you have COVID-19 symptoms now, please stay home and away from others.     Your current symptoms may or may not be COVID-19.     What is antibody testing?  This is a kind of blood test. We take a small sample of your blood, and then test it for something called antibodies.   Your body makes antibodies to fight infection. If your blood has antibodies for a certain germ, it means youve been infected with that germ in the past.   Sometimes, antibodies stay in your body for years after youve had the infection. They can be there even if the germ didnt make you sick. They are a sign that your body fought off the infection.  Will this test find antibodies in everyone whos had COVID-19?  No. The test finds antibodies in most people 10 days after they get sick. For some people, it takes longer than 10 days for antibodies to show up. Others may never show antibodies against COVID-19, especially if they have weak immune systems.  What are the signs of COVID-19?  Signs of COVID-19 can appear from 2 to 14 days (up to 2 weeks) after youre infected. Some people have no symptoms or only mild symptoms. Others get very sick. The most common symptoms are:      Cough    Shortness of breath or trouble breathing    Or at least 2 of these symptoms:      Fever    Chills    Repeated shaking with chills    Muscle pain    Headache    Sore throat    Losing your sense of taste or smell    You may have other symptoms. Please contact your doctor or clinic for any symptoms that worry you.    Where can I get more information?     To learn the [facility] guidelines for staying home, please visit the [facility] of Health website at [email or web address]    To learn more about COVID-19 and how to care for yourself at home, please visit the CDC  website at https://www.cdc.gov/coronavirus/2019-ncov/about/steps-when-sick.html    For more options for care at [facility], please visit our website at [email or web address]    [place] [facility] ([facility]) COVID-19 Hotline:  [phone]

## 2021-06-21 NOTE — LETTER
Letter by Paula Recio MD at      Author: Paula Recio MD Service: -- Author Type: --    Filed:  Encounter Date: 3/9/2021 Status: (Other)         March 10, 2021     Patient: Kit Martin   YOB: 1955   Date of Visit: 3/9/2021       To Whom It May Concern:    Kit Martin last saw me for a virtual visit June 19, 2020. I last saw him in-person September 23, 2019.  The most recent hemoglobin A1c result we have is from June 17, 2020, and that was 6.4.  Based on that result and the last time I saw him in person 9/23/2019, it would be ok for him to receive vaccine, however, we have not checked recent hemoglobin a1c to know how diabetes is currently controlled, and I have not performed physical exam on patient in over 1 year.      If you have any questions or concerns, please don't hesitate to call.    Sincerely,        Electronically signed by Paula Recio MD

## 2021-06-24 NOTE — PROGRESS NOTES
Assessment/Plan:        Diagnoses and all orders for this visit:    History of kidney stones  -     Urinalysis Macroscopic    Calculus of kidney  -     Patient Stated Goal: Prevent further stones  -     Stone Formation, 24 Hour Urine x1; Future; Expected date: 03/20/2019    Hypercalciuria  -     Foods Rich in Sodium Salt Education    Hyperoxaluria  -     Low Oxalate Food List Education      Stone Management Plan  [facility] 9/19/2016 2/5/2018 2/21/2019   Urinary Tract Infection No suspicion of infection No suspicion of infection No suspicion of infection   Renal Colic Asymptomatic at this time Asymptomatic at this time Well controlled symptoms   Renal Failure No suspicion of renal failure No suspicion of renal failure No suspicion of renal failure   Current CT date - 2/5/2018 2/18/2019   Right sided stones? - Yes Yes   R Number of ureteral stones - No ureteral stones No ureteral stones   R Number of kidney stones  - 2 Renal stones unchanged from last exam   R GSD of kidney stones - 2 - 4 -   R Hydronephrosis - None -   R Stone Event No current event No current event No current event   Diagnosis date - - -   Initial location of primary symptomatic stone - - -   Initial GSD of primary symptomatic stone - - -   Resolved date - - -   R Post-op status - - -   R Current Plan - Observe Observe   Clear rationale - - -   Observe rationale - Limited stone burden with good prognosis for spontaneous passage Limited stone burden with good prognosis for spontaneous passage   Left sided stones? No Yes Yes   L Number of ureteral stones - No ureteral stones -   L Number of kidney stones  - 3 Renal stones unchanged from last exam   L GSD of kidney stones - 4 - 10 -   L Hydronephrosis - None -   L Stone Event No current event No current event No current event   Diagnosis date - - -   Initial location of primary symptomatic stone - - -   Initial GSD of primary symptomatic stone - - -   Resolved date - - -   Post-op status - -  -   L Current Plan Observe Observe Observe   Clear rationale - - -   Observe rationale Limited stone burden with good prognosis for spontaneous passage Limited stone burden with good prognosis for spontaneous passage Significant stone burden amenable to emergency management             Subjective:      HPI  Mr. Kit Martin is a 63 y.o.  male returning to the [facility] [place] for follow up of his stone disease.    He reports doing well over the last year. He admits to lapsing on his stone prevention diet.    CT from today fortuitously demonstrates stable stone burden.    His 24 hour urine collection demonstrates significant hypercalciuria and hyperoxaluria mitigated by a very high urine output.    We have a detailed discussion of the importance of adhering to a low salt, low oxalate diet. His wife, who accompanies him today is actually a dietician, and I enlist her help in making some headway on dietary control. She prepares most of his food and admits that her traditional  fare is high salt. Will recheck a 24 hour urine in a month.     ROS   Review of systems is negative except for HPI.    Past Medical History:   Diagnosis Date     Anxiety      Depression      GERD (gastroesophageal reflux disease)      Hypercholesterolemia      Hypertension      Kidney stone      Overweight      PONV (postoperative nausea and vomiting)      Type 2 diabetes mellitus (H)        Past Surgical History:   Procedure Laterality Date     CYSTOSCOPY W/ URETERAL STENT PLACEMENT Bilateral 11/3/2015    Procedure: CYSTOSCOPY, BILATERAL URETEROSCOPY, LASER LITHOTRIPSY, STENT INSERTION;  Surgeon: Herman Lucia MD;  Location: [facility];  Service:      LITHOTRIPSY       MI CYSTOTOMY,EXTRACT &/OR FRAG URETER CALC      Description: Bladder Cystotomy With Basket Extraction Of Calculus;  Recorded: 02/01/2012;     MI FRAGMENT KIDNEY STONE/ ESWL Left     Description: Lithotripsy;  Recorded: 02/01/2012;   Comments: TIMES TWO.     URETEROSCOPY         Current Outpatient Medications   Medication Sig Dispense Refill     ACCU-CHEK SMARTVIEW TEST STRIP strips TEST 1 TO 2 TIMES DAILY 100 strip 1     aspirin 81 MG EC tablet Take 1 tablet (81 mg total) by mouth daily.  0     blood glucose meter (ONETOUCH ULTRA2) With device kit. Test four times daily Dx: 250       generic lancets Use 1 each As Directed as needed. OneTouch Lancets Miscellaneous. Use four times daily. 100 each 11     metFORMIN (GLUCOPHAGE) 500 MG tablet Take 2 tablets (1,000 mg total) by mouth daily with breakfast AND 1 tablet (500 mg total) daily with supper. 270 tablet 2     potassium citrate (UROCIT-K) 10 mEq (1,080 mg) SR tablet TAKE 2 TABLETS (20 MEQ TOTAL) BY MOUTH DAILY WITH BREAKFAST. 180 tablet 0     pravastatin (PRAVACHOL) 20 MG tablet Take 1 tablet (20 mg total) by mouth at bedtime. 90 tablet 3     chlorthalidone (HYGROTEN) 25 MG tablet Take 1 tablet (25 mg total) by mouth daily. 90 tablet 4     No current facility-administered medications for this visit.        Allergies   Allergen Reactions     Dilaudid [Hydromorphone] Itching       Social History     Socioeconomic History     Marital status:      Spouse name: Not on file     Number of children: Not on file     Years of education: Not on file     Highest education level: Not on file   Occupational History     Occupation: owns a transcription company     Employer: [employer]   Social Needs     Financial resource strain: Not on file     Food insecurity:     Worry: Not on file     Inability: Not on file     Transportation needs:     Medical: Not on file     Non-medical: Not on file   Tobacco Use     Smoking status: Never Smoker     Smokeless tobacco: Never Used   Substance and Sexual Activity     Alcohol use: Yes     Comment: Less than weekly.     Drug use: No     Sexual activity: Yes     Partners: Female   Lifestyle     Physical activity:     Days per week: Not on file     Minutes  "per session: Not on file     Stress: Not on file   Relationships     Social connections:     Talks on phone: Not on file     Gets together: Not on file     Attends Bahai service: Not on file     Active member of club or organization: Not on file     Attends meetings of clubs or organizations: Not on file     Relationship status: Not on file     Intimate partner violence:     Fear of current or ex partner: Not on file     Emotionally abused: Not on file     Physically abused: Not on file     Forced sexual activity: Not on file   Other Topics Concern     Not on file   Social History Narrative    .  Lives with wife.  Works for [employer] in [employer].  Has 2 adult daughters.         Family History   Problem Relation Age of Onset     Heart disease Mother 67        Heart attack     Stroke Father          at 93     Other Sister 15         at 15, two years post surgery for non-union of a leg fracture that never did heal.     Stroke Brother         Mild stroke     Hyperlipidemia Brother      Hypertension Brother      Urolithiasis Brother         recurrent     Heart disease Brother 61        \"Heart attack\"     No Medical Problems Daughter      Hypertension Brother      Diabetes Brother      No Medical Problems Daughter      Objective:      Physical Exam  Vitals:    19 1610   BP: 134/65   Pulse: 85   Temp: 97.9  F (36.6  C)     General - well developed, well nourished, appropriate for age. Appears no distress at this time  Abdomen - moderately obese soft, non-tender, no hepatosplenomegaly, no masses.   - no flank tenderness, no suprapubic tenderness, kidney and bladder non-palpable  MSK - normal spinal curvature. no spinal tenderness. normal gait. muscular strength intact.  Psych - oriented to time, place, and person, normal mood and affect.      Labs   Urinalysis POC (Office):  Blood UA   Date Value Ref Range Status   2016 Trace Negative Final   2016 Negative Negative Final   2016 " Negative Negative Final     Nitrite, UA   Date Value Ref Range Status   02/18/2019 Negative Negative Final   02/05/2018 Negative Negative Final   10/14/2016 Negative Negative Final     Leukocytes, UA    Date Value Ref Range Status   09/19/2016 Negative Negative Final   07/13/2016 Negative Negative Final   02/29/2016 Negative Negative Final     pH UA   Date Value Ref Range Status   09/19/2016 6.5 4.5 - 8.0 Final   07/13/2016 7.0 4.5 - 8.0 Final   02/29/2016 6.5 4.5 - 8.0 Final       Lab Urinalysis:  Blood, UA   Date Value Ref Range Status   02/18/2019 Negative Negative Final   02/05/2018 Negative Negative Final   10/14/2016 Trace (!) Negative Final     Nitrite, UA   Date Value Ref Range Status   02/18/2019 Negative Negative Final   02/05/2018 Negative Negative Final   10/14/2016 Negative Negative Final     Leukocytes, UA   Date Value Ref Range Status   02/18/2019 Negative Negative Final   02/05/2018 Negative Negative Final   10/14/2016 Negative Negative Final     pH, UA   Date Value Ref Range Status   02/18/2019 7.0 5.0 - 8.0 Final   02/05/2018 7.0 5.0 - 8.0 Final   10/14/2016 7.0 4.5 - 8.0 Final    and Stone prevention labs   24 hour urine   Creatinine, 24 Hour Urine   Date Value Ref Range Status   03/02/2012 1,241.2 1,000.0 - 2,000.0 mg/24Hr Final   01/10/2012 1,008.9 1,000.0 - 2,000.0 mg/24Hr Final   11/01/2011 1,749.0 1,000.0 - 2,000.0 mg/24Hr Final     Calcium, 24H Urine   Date Value Ref Range Status   02/06/2019 383 (H) 25 - 300 mg/24hr Final     Comment:       Hypercalciuria >350  Values are for persons with  average daily calcium intake  (600-800 mg/day)   01/08/2018 290 25 - 300 mg/24hr Final     Comment:       Hypercalciuria >350  Values are for persons with  average daily calcium intake  (600-800 mg/day)   08/29/2016 455 (H) 25 - 300 mg/24hr Final     Comment:       Hypercalciuria >350  Values are for persons with  average daily calcium intake  (600-800 mg/day)     Sodium, 24 Hour Urine   Date Value Ref  Range Status   02/06/2019 194 40 - 217 mmol/24hr Final   01/08/2018 191 40 - 217 mmol/24hr Final   08/29/2016 255 (H) 40 - 217 mmol/24hr Final     Citrate, 24 Hour Urine   Date Value Ref Range Status   02/06/2019 613 434-1,191 mg/24hr Final     Comment:       Reference Ranges are not  established for 0-19 years  or >60 years of age.   01/08/2018 321 (L) 434 - 1,191 mg/24hr Final     Comment:       Reference Ranges are not  established for 0-19 years  or >60 years of age.   08/29/2016 499 427 - 1,191 mg/24hr Final     Comment:       Reference Ranges are not  established for 0-19 years  or >60 years of age.     Oxalate, 24 Hour Urine   Date Value Ref Range Status   02/06/2019 57.6 (H) 7.0 - 44.0 mg/24hr Final   01/08/2018 45.4 (H) 7.0 - 44.0 mg/24hr Final   08/29/2016 37.2 7.0 - 44.0 mg/24hr Final     pH, Urine   Date Value Ref Range Status   02/06/2019 7.0 4.5 - 8.0 Final   01/08/2018 7.0 4.5 - 8.0 Final   08/29/2016 6.5 4.5 - 8.0 Final     Urine Volume   Date Value Ref Range Status   02/06/2019 4,500 mL Final   01/08/2018 2,100 mL Final   08/29/2016 2,600 mL Final

## 2021-06-24 NOTE — PATIENT INSTRUCTIONS - HE
Patient Stated Goal: Prevent further stones  Oxalate Food List    See Handout:  Do not eat foods containing more than 50 mg oxalate per 100 gm serving.  Foods containing between 5-50mg should be eaten in moderation (a single 4oz. serving per day).  Remember the purpose of the low oxalate diet is to avoid supersaturation (excess concentration) of the urine with oxalate; therefore small amounts periodically are less harmful than single large amounts.  FOODS RICH IN SODIUM/SALT    If you eat too much salt or sodium, you may increase the calcium in your urine.  That may cause stones to form.  A low sodium/salt diet is recommended to reduce excess urinary calcium excretion that can potentially form a kidney stone.  The recommended sodium intake by [facility] is less than 2500 milligrams per day.     You should usually avoid these items:    ? Salt - One teaspoon of table salt has 2400 milligrams of sodium  ? Processed foods - salt is added in large amounts to some regular foods    ? Examples are:      Canned foods - soups, stews, sauces, gravy mixes, and some vegetables      Frozen foods - dinners, entrees, vegetables with sauces      Snack foods - salted chips, popcorn, pretzels, pork rinds and crackers      Packaged starchy foods - seasoned noodle or rice dishes, stuffing mix, macaroni and cheese dinner      Instant cooking foods to which you add hot water and stir - potatoes, cereals, noodles, etc.       (salt is added to make precooked foods absorb water faster)      Mixes - cornbread, biscuit, cake or pudding      Meats and cheeses  - Deli or lunch meats - bologna, ham, turkey, roast beef, etc.  - Cured or smoked meats - corned beef, farias, sausage of any kind  (david, link, Kielbasa, [place], wieners or hot dogs)  - Canned meats - potted meats, spreads, Veronica sausage, etc.  - Cheeses - read labels and avoid those with more than 140 mg sodium per serving;  - Examples are:       American cheese      Leonardo Rodriguez  Whiz      ? Condiments, Sauces and Seasonings        Mustard, ketchup, salad dressings, bouillon cubes or granules      Sauces - Worcestershire, barbecue, pizza, chili, steak, soy, or horseradish sauce      Meat tenderizer, monosodium glutamate      Any seasoning that has  salt  in the name or on the label;  -  Avoid celery, garlic and onion salt; however use garlic or onion powder or flakes instead       Pickles and olives    What can you use to season you food?         Tart - try lemon or lime juice, vinegar      Hot - peppers are low in sodium; hot sauce has salt, but using just a drop or two will not add        up to much       Herbs and spices - onions, garlic, salt-free seasonings

## 2021-06-26 NOTE — PROGRESS NOTES
Progress Notes by Paula Recio MD at 11/12/2018  8:50 AM     Author: Paula Recio MD Service: -- Author Type: Physician    Filed: 11/12/2018 12:09 PM Encounter Date: 11/12/2018 Status: Signed    : Paula Recio MD (Physician)       MALE PREVENTATIVE EXAM    Assessment and Plan:       1. Routine general medical examination at a health care facility  - [facility](CBC w/o Differential)    2. Type 2 diabetes mellitus without complication, without long-term current use of insulin (H)  Reviewed today's hemoglobin A1c result with patient, which has increased slightly.  Given he is still not at goal at hemoglobin A1c less than 7.0, would recommend we increase metformin to 1000 mg in the morning, 500 mg in evening.  Will check labs as well.  Needs diabetes check up at leastonce every 3-6 months  - Glycosylated Hemoglobin A1c  - Microalbumin, Random Urine  - metFORMIN (GLUCOPHAGE) 500 MG tablet; Take 2 tablets (1,000 mg total) by mouth daily with breakfast AND 1 tablet (500 mg total) daily with supper.  Dispense: 90 tablet; Refill: 2    4. Hyperlipidemia LDL goal <70  Fastint today, will check labs.  Continue statin.  - Lipid [place]     Next follow up:  Return in about 6 months (around 5/12/2019) for Diabetes Follow-up.    Immunization Review  Adult Imm Review: Declines immunizations today      I discussed the following with the patient:   Adult Healthy Living: Importance of regular exercise  Healthy nutrition        Subjective:   Chief Complaint: Kit Martin is an 63 y.o. male here for a preventative health visit.     HPI:  Additional concerns:    Diabetes: Patient has recently noted his fasting sugars have been slightly elevated, usually in the 100 to 120-130.  He does state that he has been attending more festivals, and thus eating more carbs.  Not exercising as often.    Since last visit with me, experienced calculus, was seen by Dr. Lucia.  States he needs follow-up, but plans to schedule follow-up on his  "own.    Healthy Habits  Are you taking a daily aspirin? Yes  Do you typically exercising at least 40 min, 3-4 times per week?  Yes  Do you usually eat at least 4 servings of fruit and vegetables a day, include whole grains and fiber and avoid regularly eating high fat foods? Yes  Have you had an eye exam in the past two years? Yes    Safety Screen  If you own firearms, are they secured in a locked gun cabinet or with trigger locks? The patient declines to answer  Do you feel you are safe where you are living?: Yes (11/12/2018  8:57 AM)  Do you feel you are safe in your relationship(s)?: Yes (11/12/2018  8:57 AM)      Review of Systems:  Please see above.  The rest of the review of systems are negative for all systems.     Cancer Screening       Status Date      COLONOSCOPY Next Due 11/17/2024      Done 11/17/2014 ENDOSCOPY, COLON          Patient Care Team:  Paula Recio MD as PCP - General (Family Medicine)        History     Reviewed By Date/Time Sections Reviewed    Paula Recio MD 11/12/2018  9:08 AM Tobacco, Alcohol, Drug Use, Sexual Activity, Social Documentation    Paula Recio MD 11/12/2018  9:07 AM Medical, Surgical, Family    Gaviota Manzo [facility] 11/12/2018  8:57 AM Tobacco            Objective:   Vital Signs:   Visit Vitals  /60 (Patient Site: Left Arm, Patient Position: Sitting, Cuff Size: Adult Regular)   Pulse 68   Temp 97.6  F (36.4  C) (Oral)   Resp 12   Ht 5' 7.44\" (1.713 m)   Wt 184 lb (83.5 kg)   BMI 28.44 kg/m           PHYSICAL EXAM  General Appearance: Alert, cooperative, no distress, appears stated age  Head: Normocephalic, without obvious abnormality, atraumatic  Eyes: PERRL, conjunctiva/corneas clear, EOM's intact  Ears: Normal TM's and external ear canals, both ears  Nose: Nares normal, septum midline,mucosa normal, no drainage  Throat: Lips, mucosa, and tongue normal; teeth and gums normal  Neck: Supple, symmetrical, trachea midline, no adenopathy;  thyroid: not enlarged, " symmetric, no tenderness/mass/nodules; no carotid bruit or JVD  Lungs: Clear to auscultation bilaterally, respirations unlabored  Heart: Regular rate and rhythm, S1 and S2 normal, no murmur.  Abdomen: Soft, non-tender, bowel sounds active all four quadrants,  no masses, no organomegaly  Genitourinary: Penis normal. No hernias palpated  Musculoskeletal: Normal range of motion. No joint swelling or deformity.   Extremities: Extremities normal, atraumatic, no cyanosis or edema  Skin: Skin color, texture, turgor normal, no rashes or lesions  Lymph nodes: Cervical, supraclavicular, and axillary nodes normal  Neurologic: Alert.  Normal speech.  No focal deficits.  Normal deep tendon reflexes.   Psychiatric: Normal mood and affect.  Does not appear anxious or depressed.        The ASCVD Risk score ([place] SHERI Jr., et al., 2013) failed to calculate for the following reasons:    The BP treatment status is invalid         Medication List           Accurate as of 11/12/18 12:07 PM. If you have any questions, ask your nurse or doctor.               CHANGE how you take these medications    metFORMIN 500 MG tablet  Also known as:  GLUCOPHAGE  INSTRUCTIONS:  Take 2 tablets (1,000 mg total) by mouth daily with breakfast AND 1 tablet (500 mg total) daily with supper.  Doctor's comments:  Due to be seen  What changed:      See the new instructions.    Another medication with the same name was removed. Continue taking this medication, and follow the directions you see here.  Changed by:  Paula Recio MD           CONTINUE taking these medications    ACCU-CHEK SMARTVIEW TEST STRIP strips  INSTRUCTIONS:  TEST 1 TO 2 TIMES DAILY  Doctor's comments:  PLEASE SEND NEW SCRIPT FOR LANCETS AND TEST STRIPS. PATIENT HAS AN ACCU CHEK JOCELYNN METER.  Generic drug:  blood glucose test        aspirin 81 MG EC tablet  INSTRUCTIONS:  Take 1 tablet (81 mg total) by mouth daily.        chlorthalidone 25 MG tablet  Also known as:  HYGROTEN  INSTRUCTIONS:   Take 1 tablet (25 mg total) by mouth daily.        generic lancets  INSTRUCTIONS:  Use 1 each As Directed as needed. OneTouch Lancets Miscellaneous. Use four times daily.        ONETOUCH ULTRA2  INSTRUCTIONS:  With device kit. Test four times daily Dx: 250  Generic drug:  blood glucose meter        pravastatin 20 MG tablet  Also known as:  PRAVACHOL  INSTRUCTIONS:  Take 1 tablet (20 mg total) by mouth at bedtime. Needs appointment before additional refills.           STOP taking these medications    IRON ORAL  Stopped by:  Paula Recio MD     UNABLE TO FIND  Stopped by:  Paula Recio MD           Where to Get Your Medications      These medications were sent to [place] [id number] [place] [facility] [address]  [address] [place] [address]    Phone:  [phone]     metFORMIN 500 MG tablet         Additional Screenings Completed Today:     Recent Results (from the past 240 hour(s))   Glycosylated Hemoglobin A1c    Collection Time: 11/12/18  9:25 AM   Result Value Ref Range    Hemoglobin A1c 7.2 (H) 3.5 - 6.0 %   HM2(CBC w/o Differential)    Collection Time: 11/12/18  9:25 AM   Result Value Ref Range    WBC 6.7 4.0 - 11.0 thou/uL    RBC 4.99 4.40 - 6.20 mill/uL    Hemoglobin 13.1 (L) 14.0 - 18.0 g/dL    Hematocrit 41.6 40.0 - 54.0 %    MCV 83 80 - 100 fL    MCH 26.3 (L) 27.0 - 34.0 pg    MCHC 31.6 (L) 32.0 - 36.0 g/dL    RDW 13.2 11.0 - 14.5 %    Platelets 257 140 - 440 thou/uL    MPV 9.0 7.0 - 10.0 fL

## 2021-06-29 NOTE — PROGRESS NOTES
Progress Notes by Altaf Salamanca MD at 7/3/2020 11:20 AM     Author: Altaf Salamanca MD Service: -- Author Type: Physician    Filed: 7/3/2020 12:54 PM Encounter Date: 7/3/2020 Status: Signed    : Altaf Salamanca MD (Physician)       MALE PREVENTATIVE EXAM    Assessment and Plan:       1. Encounter for general adult medical examination without abnormal findings  He declines pneumovax and shingles vaccine today. He declines PSA testing today after shared decision discussion; no high risk factors or urinary symptoms.     2. Encounter for screening for lipoid disorders  On statin.  - Lipid [place] FASTING    3. DM type 2 (diabetes mellitus, type 2) (H)  Recent A1c was 6.4, which is good control. Will check labs and refill his medication.  I encouraged him to schedule an appointment with his eye doctor.  He had a diabetic foot exam today which was normal.  - Basic Metabolic Panel  - Microalbumin, Random Urine  - aspirin 81 MG EC tablet; Take 1 tablet (81 mg total) by mouth daily.  Dispense: 90 tablet; Refill: 3  - metFORMIN (GLUCOPHAGE) 500 MG tablet; TAKE 2 TABLETS BY MOUTH DAILY WITH BREAKFAST AND 1 TABLET DAILY WITH SUPPER.  Dispense: 270 tablet; Refill: 3    4. Hypercholesterolemia  Refill sent.  - pravastatin (PRAVACHOL) 20 MG tablet; Take 1 tablet (20 mg total) by mouth at bedtime.  Dispense: 90 tablet; Refill: 3    5. Urinary tract stones  He is on chlorthalidone and over-the-counter potassium per his urologist.  He will schedule follow-up with them.  We will check a UA today because he has had history of a very large kidney stone without any symptoms except for microscopic hematuria.  - Urinalysis-UC if Indicated  - chlorthalidone (HYGROTEN) 25 MG tablet; Take 1 tablet (25 mg total) by mouth daily.  Dispense: 90 tablet; Refill: 3    6. Dermatitis  He has a hyperpigmented papules on his right forearm.  We will try a more potent topical steroid.  If after 14 days it does  not resolve, I discussed with him that I would like him to see dermatology to discuss further steps, i.e. including biopsy.  - triamcinolone (KENALOG) 0.1 % ointment; Apply twice a day to rash for 14 days, then as needed.  Dispense: 30 g; Refill: 0    7. Exposure to COVID-19 virus  He would like antibody testing for COVID.  - COVID-19 Virus (Coronavirus) Antibody & Titer Reflex     Next follow up:  Return in 6 months (on 1/3/2021).    Immunization Review  Adult Imm Review: Declines immunizations today    I discussed the following with the patient:   Adult Healthy Living: Importance of regular exercise  Healthy nutrition    Altaf Salamanca MD  Internal Medicine and Pediatrics  [facility]  Pager [phone]     Subjective:   Chief Complaint: Kit Martin is an 65 y.o. male here for a preventative health visit.     HPI:      He is taking his metformin, 2 tablets in the morning and 1 in the evening, as prescribed.  Before call back, he was going to the gym frequently, however due to the pandemic he has not been working out very much.  He just had his A1c checked and it was 6.4.  He does not have any neuropathy, retinopathy, or foot concerns.  He has not seen the eye doctor recently, needs to schedule that.    He has a history of kidney stones.  He has not seen a urologist in more than a year now.  He has not had a symptomatic stone now for about 4 years or so per his report.  He is taking his chlorthalidone and potassium for that.  He gets his potassium over-the-counter.    He does take a daily aspirin.  He has been compliant with his statin.    He does not have any urinary symptoms.  No family history of any prostate cancer.    He has a rash on the inside of his forearm.  It is not itchy.  He has tried rubbing a red hot chili pepper on it.  That did improve it and then it would go away and come back.  He is using over-the-counter topical steroid hydrocortisone ointment on it.  Is not  "completely resolved.  Is only on his right hand.  His mother also has a similar rash.      Healthy Habits  Are you taking a daily aspirin? Yes  Do you typically exercising at least 40 min, 3-4 times per week?  NO  Do you usually eat at least 4 servings of fruit and vegetables a day, include whole grains and fiber and avoid regularly eating high fat foods? Yes  Have you had an eye exam in the past two years? NO  Do you see a dentist twice per year? NO  Do you have any concerns regarding sleep? YES    Safety Screen  If you own firearms, are they secured in a locked gun cabinet or with trigger locks? NO  Do you feel you are safe where you are living?: Yes (7/3/2020 11:37 AM)  Do you feel you are safe in your relationship(s)?: Yes (7/3/2020 11:37 AM)      Review of Systems:  Please see above.  The rest of the review of systems are negative for all systems.     Cancer Screening     Patient has no health maintenance due at this time          Patient Care Team:  Paula Recio MD as PCP - General (Family Medicine)  Paula Recio MD as Assigned PCP        History     Reviewed By Date/Time Sections Reviewed    Altaf Salaamnca MD 7/3/2020 11:43 AM Tobacco, Alcohol, Drug Use, Sexual Activity    Gaviota Manzo CMA 7/3/2020 11:40 AM Tobacco            Objective:   Vital Signs:   Visit Vitals  /68   Pulse 76   Temp 98.3  F (36.8  C) (Oral)   Resp 16   Ht 5' 7.6\" (1.717 m)   Wt 180 lb (81.6 kg)   BMI 27.70 kg/m           PHYSICAL EXAM  /68   Pulse 76   Temp 98.3  F (36.8  C) (Oral)   Resp 16   Ht 5' 7.6\" (1.717 m)   Wt 180 lb (81.6 kg)   BMI 27.70 kg/m      General Appearance:    Alert, cooperative, no distress, appears stated age   Head:    Normocephalic, without obvious abnormality, atraumatic   Eyes:    PERRL, conjunctiva/corneas clear, EOM's intact, fundi     benign, both eyes        Ears:    Normal TM's and external ear canals, both ears   Nose:   Nares normal, septum midline, mucosa " normal, no drainage    or sinus tenderness   Throat:   Lips, mucosa, and tongue normal; teeth and gums normal   Neck:   Supple, symmetrical, trachea midline, no adenopathy;        thyroid:  No enlargement/tenderness/nodules; no carotid    bruit or JVD   Back:     Symmetric, no curvature, ROM normal, no CVA tenderness   Lungs:     Clear to auscultation bilaterally, respirations unlabored   Chest wall:    No tenderness or deformity   Heart:    Regular rate and rhythm, S1 and S2 normal, no murmur, rub    or gallop   Abdomen:     Soft, non-tender, bowel sounds active all four quadrants,     no masses, no organomegaly   Genitalia:   Deferred   Rectal:    Deferred   Extremities:   Extremities normal, atraumatic, no cyanosis or edema   Pulses:   2+ dorsalis pedis pulses bilaterally   Skin:   There is hyperpigmented papules on the right forearm   Neurologic:   CNII-XII intact. Normal strength, sensation grossly        Medication List          Accurate as of July 3, 2020 12:51 PM. If you have any questions, ask your nurse or doctor.            START taking these medications    triamcinolone 0.1 % ointment  Also known as:  KENALOG  INSTRUCTIONS:  Apply twice a day to rash for 14 days, then as needed.  Started by:  Altaf Salamanca MD           CONTINUE taking these medications    Accu-Chek SmartView Test Strip strips  INSTRUCTIONS:  TEST 1 TO 2 TIMES DAILY  Doctor's comments:  PLEASE SEND NEW SCRIPT FOR LANCETS AND TEST STRIPS. PATIENT HAS AN ACCU CHEK JOCELYNN METER.  Generic drug:  blood glucose test        aspirin 81 MG EC tablet  INSTRUCTIONS:  Take 1 tablet (81 mg total) by mouth daily.        chlorthalidone 25 MG tablet  Also known as:  HYGROTEN  INSTRUCTIONS:  Take 1 tablet (25 mg total) by mouth daily.  Doctor's comments:  Will call when needed.        generic lancets  INSTRUCTIONS:  Use 1 each As Directed as needed. OneTouch Lancets Miscellaneous. Use four times daily.        metFORMIN 500 MG tablet  Also  known as:  GLUCOPHAGE  INSTRUCTIONS:  TAKE 2 TABLETS BY MOUTH DAILY WITH BREAKFAST AND 1 TABLET DAILY WITH SUPPER.        OneTouch Ultra2 Meter  INSTRUCTIONS:  With device kit. Test four times daily Dx: 250  Generic drug:  blood glucose meter        POTASSIUM CITRATE ORAL  INSTRUCTIONS:  Take by mouth.        pravastatin 20 MG tablet  Also known as:  PRAVACHOL  INSTRUCTIONS:  Take 1 tablet (20 mg total) by mouth at bedtime.  Doctor's comments:  Will call when needed.              Where to Get Your Medications      These medications were sent to [place] [id number] [place] [facility] [place] [address] [facility]  [address] [address]    Phone:  [phone]     aspirin 81 MG EC tablet    chlorthalidone 25 MG tablet    metFORMIN 500 MG tablet    pravastatin 20 MG tablet    triamcinolone 0.1 % ointment             Additional Screenings Completed Today:

## 2021-07-04 ENCOUNTER — RECORDS - HEALTHEAST (OUTPATIENT)
Dept: INTERNAL MEDICINE | Facility: CLINIC | Age: 66
End: 2021-07-04

## 2021-07-04 NOTE — TELEPHONE ENCOUNTER
Telephone Encounter by Hermelinda Ortiz RN at 7/4/2021  9:29 AM     Author: Hermelinda Ortiz RN Service: -- Author Type: Registered Nurse    Filed: 7/4/2021  9:29 AM Encounter Date: 7/4/2021 Status: Signed    : Hermelinda Ortiz RN (Registered Nurse)       RN cannot approve Refill Request    RN can NOT refill this medication PCP messaged that patient is overdue for Labs and Office Visit. Last office visit: Visit date not found Last Physical: 7/3/2020 Last MTM visit: Visit date not found Last visit same specialty: Visit date not found.  Next visit within 3 mo: Visit date not found  Next physical within 3 mo: Visit date not found      Jodee Tadeo Connection Triage/Med Refill 7/4/2021    Requested Prescriptions   Pending Prescriptions Disp Refills   ? aspirin 81 MG EC tablet [Pharmacy Med Name: SM ASPIRIN EC 81 MG TABLET] 90 tablet 3     Sig: TAKE 1 TABLET BY MOUTH EVERY DAY       Aspirin/Dipyridamole Refill Protocol Failed - 7/4/2021  9:08 AM        Failed - PCP or prescribing provider visit in past 12 months       Last office visit with prescriber/PCP: Visit date not found OR same dept: Visit date not found OR same specialty: Visit date not found  Last physical: 7/3/2020 Last MTM visit: Visit date not found    Next appt within 3 mo: Visit date not found Next physical within 3 mo: Visit date not found  Prescriber OR PCP: Altaf Salamanca MD  Last diagnosis associated with med order: 1. DM type 2 (diabetes mellitus, type 2) (H)  - aspirin 81 MG EC tablet [Pharmacy Med Name: SM ASPIRIN EC 81 MG TABLET]; TAKE 1 TABLET BY MOUTH EVERY DAY  Dispense: 90 tablet; Refill: 3  - metFORMIN (GLUCOPHAGE) 500 MG tablet [Pharmacy Med Name: METFORMIN  MG TABLET]; TAKE 2 TABLETS BY MOUTH DAILY WITH BREAKFAST AND 1 TABLET DAILY WITH SUPPER.  Dispense: 270 tablet; Refill: 3    2. Hypercholesterolemia  - pravastatin (PRAVACHOL) 20 MG tablet [Pharmacy Med Name: PRAVASTATIN SODIUM 20 MG  TAB]; TAKE 1 TABLET BY MOUTH EVERYDAY AT BEDTIME  Dispense: 90 tablet; Refill: 3    If protocol passes may refill for 6 months if within 3 months of last provider visit (or a total of 9 months).           ? metFORMIN (GLUCOPHAGE) 500 MG tablet [Pharmacy Med Name: METFORMIN  MG TABLET] 270 tablet 3     Sig: TAKE 2 TABLETS BY MOUTH DAILY WITH BREAKFAST AND 1 TABLET DAILY WITH SUPPER.       Metformin Refill Protocol Failed - 7/4/2021  9:08 AM        Failed - Blood pressure in last 12 months     BP Readings from Last 1 Encounters:   07/03/20 116/68             Failed - LFT or AST or ALT in last 12 months     Albumin   Date Value Ref Range Status   02/06/2019 4.0 3.5 - 5.0 g/dL Final     Bilirubin, Total   Date Value Ref Range Status   10/14/2016 0.5 0.0 - 1.0 mg/dL Final     Alkaline Phosphatase   Date Value Ref Range Status   10/14/2016 55 45 - 120 U/L Final     AST   Date Value Ref Range Status   03/12/2018 19 0 - 40 U/L Final     ALT   Date Value Ref Range Status   10/14/2016 42 0 - 45 U/L Final     Protein, Total   Date Value Ref Range Status   10/14/2016 6.9 6.0 - 8.0 g/dL Final                Failed - GFR or Serum Creatinine in last 6 months     GFR MDRD Non Af Amer   Date Value Ref Range Status   07/03/2020 >60 >60 mL/min/1.73m2 Final     GFR MDRD Af Amer   Date Value Ref Range Status   07/03/2020 >60 >60 mL/min/1.73m2 Final             Failed - Visit with PCP or prescribing provider visit in last 6 months or next 3 months     Last office visit with prescriber/PCP: Visit date not found OR same dept: Visit date not found OR same specialty: Visit date not found Last physical: Visit date not found Last MTM visit: Visit date not found         Next appt within 3 mo: Visit date not found  Next physical within 3 mo: Visit date not found  Prescriber OR PCP: Altaf Salamanca MD  Last diagnosis associated with med order: 1. DM type 2 (diabetes mellitus, type 2) (H)  - aspirin 81 MG EC tablet [Pharmacy Med  Name: SM ASPIRIN EC 81 MG TABLET]; TAKE 1 TABLET BY MOUTH EVERY DAY  Dispense: 90 tablet; Refill: 3  - metFORMIN (GLUCOPHAGE) 500 MG tablet [Pharmacy Med Name: METFORMIN  MG TABLET]; TAKE 2 TABLETS BY MOUTH DAILY WITH BREAKFAST AND 1 TABLET DAILY WITH SUPPER.  Dispense: 270 tablet; Refill: 3    2. Hypercholesterolemia  - pravastatin (PRAVACHOL) 20 MG tablet [Pharmacy Med Name: PRAVASTATIN SODIUM 20 MG TAB]; TAKE 1 TABLET BY MOUTH EVERYDAY AT BEDTIME  Dispense: 90 tablet; Refill: 3     If protocol passes may refill for 12 months if within 3 months of last provider visit (or a total of 15 months).           Failed - A1C in last 6 months     Hemoglobin A1c   Date Value Ref Range Status   06/17/2020 6.4 (H) 3.5 - 6.0 % Final               Failed - Microalbumin in last year      Microalbumin, Random Urine   Date Value Ref Range Status   07/03/2020 <0.50 0.00 - 1.99 mg/dL Final                   ? pravastatin (PRAVACHOL) 20 MG tablet [Pharmacy Med Name: PRAVASTATIN SODIUM 20 MG TAB] 90 tablet 3     Sig: TAKE 1 TABLET BY MOUTH EVERYDAY AT BEDTIME       Statins Refill Protocol (Hmg CoA Reductase Inhibitors) Failed - 7/4/2021  9:08 AM        Failed - PCP or prescribing provider visit in past 12 months      Last office visit with prescriber/PCP: Visit date not found OR same dept: Visit date not found OR same specialty: Visit date not found  Last physical: 7/3/2020 Last MTM visit: Visit date not found   Next visit within 3 mo: Visit date not found  Next physical within 3 mo: Visit date not found  Prescriber OR PCP: Altaf Salamanca MD  Last diagnosis associated with med order: 1. DM type 2 (diabetes mellitus, type 2) (H)  - aspirin 81 MG EC tablet [Pharmacy Med Name: SM ASPIRIN EC 81 MG TABLET]; TAKE 1 TABLET BY MOUTH EVERY DAY  Dispense: 90 tablet; Refill: 3  - metFORMIN (GLUCOPHAGE) 500 MG tablet [Pharmacy Med Name: METFORMIN  MG TABLET]; TAKE 2 TABLETS BY MOUTH DAILY WITH BREAKFAST AND 1 TABLET DAILY  WITH SUPPER.  Dispense: 270 tablet; Refill: 3    2. Hypercholesterolemia  - pravastatin (PRAVACHOL) 20 MG tablet [Pharmacy Med Name: PRAVASTATIN SODIUM 20 MG TAB]; TAKE 1 TABLET BY MOUTH EVERYDAY AT BEDTIME  Dispense: 90 tablet; Refill: 3    If protocol passes may refill for 12 months if within 3 months of last provider visit (or a total of 15 months).

## 2021-07-05 NOTE — TELEPHONE ENCOUNTER
Telephone Encounter by Paula Recio MD at 7/5/2021  7:38 AM     Author: Paula Recio MD Service: -- Author Type: Physician    Filed: 7/5/2021  7:42 AM Encounter Date: 7/4/2021 Status: Signed    : Paula Recio MD (Physician)       1 month rx signed. Needs appointment before additional refills, as he hasn't been seen since 7/3/20

## 2021-07-08 ENCOUNTER — COMMUNICATION - HEALTHEAST (OUTPATIENT)
Dept: FAMILY MEDICINE | Facility: CLINIC | Age: 66
End: 2021-07-08

## 2021-07-13 DIAGNOSIS — N20.0 CALCULUS OF KIDNEY: Primary | ICD-10-CM

## 2021-07-22 DIAGNOSIS — E11.9 DM TYPE 2 (DIABETES MELLITUS, TYPE 2) (H): Primary | ICD-10-CM

## 2021-07-26 NOTE — TELEPHONE ENCOUNTER
"metFORMIN (GLUCOPHAGE) 500 MG tablet 360 tablet 1 7/8/2021  No   Sig - Route: Take 2 tablets (1,000 mg total) by mouth 2 (two) times a day with meals. TAKE 2 TABLETS BY MOUTH DAILY WITH BREAKFAST AND 1 TABLET DAILY WITH SUPPER. - Oral   Sent to pharmacy as: metFORMIN 500 mg tablet (GLUCOPHAGE)   E-Prescribing Status: Receipt confirmed by pharmacy (7/8/2021  8:19 AM CDT)     Pt's last office visit was on 7/8/21 with PCP, medication passes protocol.       Last Written Prescription Date:  7/8/21  Last Fill Quantity: 360,  # refills: 1   Last office visit provider:  7/8/21     Requested Prescriptions   Pending Prescriptions Disp Refills     metFORMIN (GLUCOPHAGE) 500 MG tablet 360 tablet 3     Sig: Take 2 tablets (1,000 mg) by mouth 2 times daily (with meals)       Biguanide Agents Failed - 7/26/2021 10:45 AM        Failed - Recent (6 mo) or future (30 days) visit within the authorizing provider's specialty     Patient had office visit in the last 6 months or has a visit in the next 30 days with authorizing provider or within the authorizing provider's specialty.  See \"Patient Info\" tab in inbasket, or \"Choose Columns\" in Meds & Orders section of the refill encounter.            Passed - Patient is age 10 or older        Passed - Patient has documented A1c within the specified period of time.     If HgbA1C is 8 or greater, it needs to be on file within the past 3 months.  If less than 8, must be on file within the past 6 months.     Recent Labs   Lab Test 07/08/21 0803   A1C 7.2*             Passed - Patient's CR is NOT>1.4 OR Patient's EGFR is NOT<45 within past 12 mos.     Recent Labs   Lab Test 07/08/21 0803   GFRESTIMATED >60   GFRESTBLACK >60       Recent Labs   Lab Test 07/08/21 0803   CR 0.78             Passed - Patient does NOT have a diagnosis of CHF.        Passed - Medication is active on med list             Donnell Gallardo RN 07/26/21 5:18 PM  "

## 2021-07-26 NOTE — TELEPHONE ENCOUNTER
Pending Prescriptions:                       Disp   Refills    metFORMIN (GLUCOPHAGE) 500 MG tablet      360 ta*3            Sig: Take 2 tablets (1,000 mg) by mouth 2 times daily           (with meals)    Per Dr. Recio's note 7/8/21 patient to increase Metformin to 1000 mg BID.  2. DM type 2 (diabetes mellitus, type 2) (H)  -Hemoglobin a1c has increased to 7.2.  Recommend that he increase Metformin to 1000 mg twice daily.

## 2021-08-01 DIAGNOSIS — E78.00 PURE HYPERCHOLESTEROLEMIA, UNSPECIFIED: ICD-10-CM

## 2021-08-01 DIAGNOSIS — E11.9 TYPE 2 DIABETES MELLITUS WITHOUT COMPLICATION, WITHOUT LONG-TERM CURRENT USE OF INSULIN (H): ICD-10-CM

## 2021-08-01 DIAGNOSIS — E11.69 TYPE 2 DIABETES MELLITUS WITH OTHER SPECIFIED COMPLICATION, WITHOUT LONG-TERM CURRENT USE OF INSULIN (H): ICD-10-CM

## 2021-08-02 ENCOUNTER — VIRTUAL VISIT (OUTPATIENT)
Dept: UROLOGY | Facility: CLINIC | Age: 66
End: 2021-08-02
Payer: COMMERCIAL

## 2021-08-02 ENCOUNTER — HOSPITAL ENCOUNTER (OUTPATIENT)
Dept: CT IMAGING | Facility: HOSPITAL | Age: 66
Discharge: HOME | End: 2021-08-02
Attending: PHYSICIAN ASSISTANT | Admitting: PHYSICIAN ASSISTANT
Payer: COMMERCIAL

## 2021-08-02 DIAGNOSIS — N20.0 CALCULUS OF KIDNEY: ICD-10-CM

## 2021-08-02 DIAGNOSIS — N20.0 CALCULUS OF KIDNEY: Primary | ICD-10-CM

## 2021-08-02 PROCEDURE — 74176 CT ABD & PELVIS W/O CONTRAST: CPT

## 2021-08-02 PROCEDURE — 99213 OFFICE O/P EST LOW 20 MIN: CPT | Mod: 95 | Performed by: UROLOGY

## 2021-08-02 RX ORDER — CHLORTHALIDONE 25 MG/1
25 TABLET ORAL
COMMUNITY
Start: 2021-07-08 | End: 2022-08-05

## 2021-08-02 ASSESSMENT — PAIN SCALES - GENERAL: PAINLEVEL: NO PAIN (0)

## 2021-08-02 NOTE — PROGRESS NOTES
Assessment/Plan:    Assessment & Plan   Kit was seen today for follow up.    Diagnoses and all orders for this visit:    Calculus of kidney  -     CT Abdomen Pelvis w/o Contrast [[id number]]; Future        Stone Management Plan  Stone Management 8/2/2021   Urinary Tract Infection No suspicion of infection   Renal Colic Asymptomatic at this time   Renal Failure No suspicion of renal failure   Current CT date 8/2/2021   Right sided stones? Yes   R Number of ureteral stones No ureteral stones   R Number of kidney stones  2   R GSD of kidney stones 4 - 10   R Hydronephrosis None   R Stone Event No current event   R Current Plan Observe   Observe rationale Limited stone burden with good prognosis for spontaneous passage   Left sided stones? Yes   L Number of ureteral stones No ureteral stones   L Number of kidney stones  5   L GSD of kidney stones 2 - 4   L Hydronephrosis None   L Stone Event No current event   L Current Plan Observe   Observe rationale Limited stone burden with good prognosis for spontaneous passage             PLAN    Phone call duration: 9 minutes  15 minutes spent on the date of the encounter doing chart review, history and exam, documentation and further activities per the note    MARIA WATKINS MD  [facility]      HPI  Mr. Kit Martin is a 66 year old [place] male who is being evaluated via a billable video visit by [facility] for follow up of his stone disease.    He has done well in the interim. Remains adherent with chlorthalidone and potassium citrate. Reports that he consistently maintains dilute urine by drinking ~80 oz fluid daily.    CT demonstrates some increase in stone burden over the 2.5 years. Largest single stone is 6 mm in right upper pole. On the left side there are about 4-5 lower pole stones which have not appreciably increased in size.    Reinforced adherence to current strategy. Will recheck CT in one year  or sooner if her becomes symptomatic.    ROS   Review of systems is negative except for HPI.    Past Medical History:   Diagnosis Date     Anxiety      Anxiety      Depression      Depression      Diabetes (H)      GERD (gastroesophageal reflux disease)      GERD (gastroesophageal reflux disease)      Hypercholesterolemia      Hypercholesterolemia      Hypertension      Hypertension      Kidney stone      Kidney stone      LEFT SHOULDER IMPINGMENT 4/30/2008     Overweight      Overweight      PONV (postoperative nausea and vomiting)      PONV (postoperative nausea and vomiting)      Type 2 diabetes mellitus (H)      Type 2 diabetes mellitus (H)        Past Surgical History:   Procedure Laterality Date     C CYSTOTOMY,EXTRACT &/OR FRAG URETER CALC      Description: Bladder Cystotomy With Basket Extraction Of Calculus;  Recorded: 02/01/2012;     COMBINED CYSTOSCOPY, INSERT STENT URETER(S) Bilateral 11/3/2015    Procedure: CYSTOSCOPY, BILATERAL URETEROSCOPY, LASER LITHOTRIPSY, STENT INSERTION;  Surgeon: Herman Lucia MD;  Location: [facility];  Service:       FRAGMENTING OF KIDNEY STONE Left     Description: Lithotripsy;  Recorded: 02/01/2012;  Comments: TIMES TWO.     LITHOTRIPSY       SURGICAL HISTORY OF -       kidney stone     URETEROSCOPY         Current Outpatient Medications   Medication Sig Dispense Refill     aspirin 81 MG tablet Take 1 tablet by mouth daily. 100 tablet 3     BLOOD GLUCOSE TEST STRIPS STRP Check 2-4 times per day 2 Strip 12     chlorthalidone (HYGROTON) 25 MG tablet Take 25 mg by mouth       metFORMIN (GLUCOPHAGE) 500 MG tablet Take 2 tablets (1,000 mg) by mouth 2 times daily (with meals) 360 tablet 1     POTASSIUM CITRATE PO Take 1,080 mg by mouth daily       pravastatin (PRAVACHOL) 20 MG tablet Take by mouth daily         Allergies   Allergen Reactions     Dilaudid [Hydromorphone] Itching       Social History     Socioeconomic History     Marital status:      Spouse  name: Not on file     Number of children: Not on file     Years of education: Not on file     Highest education level: Not on file   Occupational History     Occupation:  Company     Employer: SELF     Comment: PhD in [employer].    Tobacco Use     Smoking status: Never Smoker     Smokeless tobacco: Never Used   Substance and Sexual Activity     Alcohol use: Yes     Comment: Alcoholic Drinks/day: Less than weekly.     Drug use: No     Sexual activity: Yes     Partners: Female   Other Topics Concern     Not on file   Social History Narrative    .  Lives with wife.  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  Has 2 adult daughters.       Social Determinants of Health     Financial Resource Strain:      Difficulty of Paying Living Expenses:    Food Insecurity:      Worried About Running Out of Food in the Last Year:      Ran Out of Food in the Last Year:    Transportation Needs:      Lack of Transportation (Medical):      Lack of Transportation (Non-Medical):    Physical Activity:      Days of Exercise per Week:      Minutes of Exercise per Session:    Stress:      Feeling of Stress :    Social Connections:      Frequency of Communication with Friends and Family:      Frequency of Social Gatherings with Friends and Family:      Attends [employer] Services:      Active Member of Clubs or Organizations:      Attends Club or Organization Meetings:      Marital Status:    Intimate Partner Violence:      Fear of Current or Ex-Partner:      Emotionally Abused:      Physically Abused:      Sexually Abused:        Family History   Problem Relation Age of Onset     Diabetes Mother      C.A.D. Mother         age 42     Glaucoma Mother      Diabetes Father      Cerebrovascular Disease Father          at 93     Hypertension Brother      Breast Cancer No family hx of      Cancer - colorectal No family hx of      Prostate Cancer No family hx of      Macular Degeneration No family hx of      Heart Disease Mother  "67.00        Heart attack     Other - See Comments Sister 15.00         at 15, two years post surgery for non-union of a leg fracture that never did heal.     Cerebrovascular Disease Brother         Mild stroke     Hyperlipidemia Brother      Hypertension Brother      Urolithiasis Brother         recurrent     Heart Disease Brother 61.00        \"Heart attack\"     No Known Problems Daughter      Hypertension Brother      Diabetes Brother      No Known Problems Daughter        Objective:     Appears AAO x 3  No vitals obtained due to virtual visit    Labs   Urinalysis POC (Office):  Nitrite Urine   Date Value Ref Range Status   2020 Negative Negative Final   2019 Negative Negative Final   2019 Negative Negative Final   2011 Neg NEG    2011 Neg NEG    10/26/2009 Negative NEG Final       Lab Urinalysis:  Nitrite Urine   Date Value Ref Range Status   2020 Negative Negative Final   2019 Negative Negative Final   2019 Negative Negative Final   2011 Neg NEG    2011 Neg NEG    10/26/2009 Negative NEG Final    and Acute Labs Renal Panel  KSINo results found for: CREATININE      "

## 2021-08-02 NOTE — PROGRESS NOTES
Patient is roomed via telephone for a virtual visit.  Patient confirmed he is in the [facility].  Patient understands that this virtual visit is billable and agree to proceed with appointment.

## 2021-08-06 RX ORDER — PRAVASTATIN SODIUM 20 MG
TABLET ORAL
Qty: 90 TABLET | Refills: 1 | Status: SHIPPED | OUTPATIENT
Start: 2021-08-06 | End: 2022-02-25

## 2021-09-04 DIAGNOSIS — E11.9 TYPE 2 DIABETES MELLITUS WITHOUT COMPLICATION, WITHOUT LONG-TERM CURRENT USE OF INSULIN (H): ICD-10-CM

## 2021-09-05 NOTE — TELEPHONE ENCOUNTER
"Last Written Prescription Date:  8/6/21  Last Fill Quantity: 30,  # refills: 1   Last office visit provider:  7/8/21    Requested Prescriptions   Pending Prescriptions Disp Refills     ASPIRIN LOW DOSE 81 MG EC tablet [Pharmacy Med Name: ASPIRIN EC 81 MG TABLET] 30 tablet 1     Sig: TAKE 1 TABLET BY MOUTH EVERY DAY       Analgesics (Non-Narcotic Tylenol and ASA Only) Passed - 9/4/2021  9:33 AM        Passed - Recent (12 mo) or future (30 days) visit within the authorizing provider's specialty     Patient has had an office visit with the authorizing provider or a provider within the authorizing providers department within the previous 12 mos or has a future within next 30 days. See \"Patient Info\" tab in inbasket, or \"Choose Columns\" in Meds & Orders section of the refill encounter.              Passed - Patient is age 20 years or older     If ASA is flagged for ages under 20 years old. Forward to provider for confirmation Ryes Syndrome is not a concern.              Passed - Medication is active on med list             Maik Bell RN 09/05/21 7:55 AM  "

## 2021-09-25 ENCOUNTER — HEALTH MAINTENANCE LETTER (OUTPATIENT)
Age: 66
End: 2021-09-25

## 2021-10-04 DIAGNOSIS — E11.9 TYPE 2 DIABETES MELLITUS WITHOUT COMPLICATION, WITHOUT LONG-TERM CURRENT USE OF INSULIN (H): ICD-10-CM

## 2021-10-04 RX ORDER — ASPIRIN 81 MG/1
TABLET, COATED ORAL
Qty: 90 TABLET | Refills: 2 | Status: SHIPPED | OUTPATIENT
Start: 2021-10-04 | End: 2022-02-25

## 2021-10-04 NOTE — TELEPHONE ENCOUNTER
"Last Written Prescription Date:  9/5/21  Last Fill Quantity: 30,  # refills: 1   Last office visit provider:  7/8/21     Requested Prescriptions   Pending Prescriptions Disp Refills     ASPIRIN LOW DOSE 81 MG EC tablet [Pharmacy Med Name: ASPIRIN EC 81 MG TABLET] 30 tablet 1     Sig: TAKE 1 TABLET BY MOUTH EVERY DAY       Analgesics (Non-Narcotic Tylenol and ASA Only) Passed - 10/4/2021  9:32 AM        Passed - Recent (12 mo) or future (30 days) visit within the authorizing provider's specialty     Patient has had an office visit with the authorizing provider or a provider within the authorizing providers department within the previous 12 mos or has a future within next 30 days. See \"Patient Info\" tab in inbasket, or \"Choose Columns\" in Meds & Orders section of the refill encounter.              Passed - Patient is age 20 years or older     If ASA is flagged for ages under 20 years old. Forward to provider for confirmation Ryes Syndrome is not a concern.              Passed - Medication is active on med list             Donnell Gallardo RN 10/04/21 12:31 PM  "

## 2021-10-10 DIAGNOSIS — E11.9 TYPE 2 DIABETES MELLITUS WITHOUT COMPLICATIONS (H): ICD-10-CM

## 2021-10-11 RX ORDER — BLOOD SUGAR DIAGNOSTIC
STRIP MISCELLANEOUS
Qty: 200 STRIP | Refills: 0 | Status: SHIPPED | OUTPATIENT
Start: 2021-10-11 | End: 2023-10-13

## 2021-10-11 NOTE — TELEPHONE ENCOUNTER
"  Disp Refills Start End ERIN    blood glucose test (ACCU-CHEK SMARTVIEW TEST STRIP) strips 100 strip 1 7/8/2021  No   Sig: TEST 1 TO 2 TIMES DAILY   Sent to pharmacy as: Accu-Chek SmartView Test Strips (blood glucose test)   Notes to Pharmacy: PLEASE SEND NEW SCRIPT FOR LANCETS AND TEST STRIPS. PATIENT HAS AN ACCU CHEK JOCELYNN METER.   E-Prescribing Status: Receipt confirmed by pharmacy (7/8/2021  7:55 AM CDT)     Last Written Prescription Date:  7/8/21  Last Fill Quantity: 100,  # refills: 1   Last office visit provider:  7/8/21     Requested Prescriptions   Pending Prescriptions Disp Refills     ONETOUCH ULTRA test strip [Pharmacy Med Name: ONE TOUCH ULTRA BLUE TEST STRP] 100 strip 1     Sig: TEST 1 TO 2 TIMES DAILY       Diabetic Supplies Protocol Failed - 10/10/2021  7:42 AM        Failed - Medication is active on med list        Failed - Recent (6 mo) or future (30 days) visit within the authorizing provider's specialty     Patient had office visit in the last 6 months or has a visit in the next 30 days with authorizing provider.  See \"Patient Info\" tab in inbasket, or \"Choose Columns\" in Meds & Orders section of the refill encounter.            Passed - Patient is 18 years of age or older             Maik Bell RN 10/11/21 11:07 AM  "

## 2021-11-02 ENCOUNTER — NURSE TRIAGE (OUTPATIENT)
Dept: NURSING | Facility: CLINIC | Age: 66
End: 2021-11-02

## 2021-11-02 NOTE — TELEPHONE ENCOUNTER
"Kit asks about COVID exposure.    His wife work as a dietitian and had heard that there is a COVID case at work. Wife tests weekly for COVID at work as part of workplace protocol. She has not been in close contact with those who tested positive.    He has not been around anyone with COVID.    He is scheduled for his Pfizer booster shot tomorrow.    Questions:  1) \"Do I keep my appointment tomorrow and do I need to quarantine?\"   FNA advised yes to booster shot tomorrow.  No quarantine necessary as he does not meet criteria for close contact.    2) \"Can I get Moderna shot for booster?\" He has received initial Pfizer shots. Though CDC has guidelines to mix and match vaccines, [facility] recommended to have him receive Pfizer for his booster dose.    Advised to call back if he develops symptoms or if his wife tests positive for COVID. He verbalized agreement.    Kate Curiel RN/[place] Nurse Advisor        Reason for Disposition    [1] Caller concerned that exposure to COVID-19 occurred BUT [2] does not meet COVID-19 EXPOSURE criteria from CDC    Protocols used: CORONAVIRUS (COVID-19) EXPOSURE-A- 8.25.2021    "

## 2021-12-02 ENCOUNTER — OFFICE VISIT (OUTPATIENT)
Dept: OPHTHALMOLOGY | Facility: CLINIC | Age: 66
End: 2021-12-02
Payer: COMMERCIAL

## 2021-12-02 DIAGNOSIS — H52.4 PRESBYOPIA OF BOTH EYES: ICD-10-CM

## 2021-12-02 DIAGNOSIS — H25.13 NUCLEAR SCLEROTIC CATARACT OF BOTH EYES: ICD-10-CM

## 2021-12-02 DIAGNOSIS — E11.9 TYPE 2 DIABETES MELLITUS WITHOUT OPHTHALMIC MANIFESTATIONS (H): Primary | ICD-10-CM

## 2021-12-02 DIAGNOSIS — H52.223 REGULAR ASTIGMATISM OF BOTH EYES: ICD-10-CM

## 2021-12-02 PROCEDURE — 92014 COMPRE OPH EXAM EST PT 1/>: CPT | Performed by: OPHTHALMOLOGY

## 2021-12-02 PROCEDURE — 92015 DETERMINE REFRACTIVE STATE: CPT | Performed by: OPHTHALMOLOGY

## 2021-12-02 ASSESSMENT — CONF VISUAL FIELD
OS_NORMAL: 1
OD_NORMAL: 1
METHOD: COUNTING FINGERS

## 2021-12-02 ASSESSMENT — REFRACTION_MANIFEST
OD_ADD: +2.25
OD_AXIS: 021
OS_ADD: +2.25
OD_SPHERE: +0.25
OS_AXIS: 152
OS_CYLINDER: +0.75
OS_SPHERE: -0.25
OD_CYLINDER: +1.25

## 2021-12-02 ASSESSMENT — VISUAL ACUITY
METHOD_MR: SIGNS ARE CORRECT.
OD_SC: 20/30
OD_PH_SC+: -1
OD_SC+: +2
OD_PH_SC: 20/20
OS_SC: 20/25
METHOD: SNELLEN - LINEAR

## 2021-12-02 ASSESSMENT — TONOMETRY
OS_IOP_MMHG: 10
OD_IOP_MMHG: 10
IOP_METHOD: ICARE

## 2021-12-02 ASSESSMENT — EXTERNAL EXAM - LEFT EYE: OS_EXAM: NORMAL

## 2021-12-02 ASSESSMENT — SLIT LAMP EXAM - LIDS
COMMENTS: UPPER LID DERMATOCHALASIS
COMMENTS: UPPER LID DERMATOCHALASIS

## 2021-12-02 ASSESSMENT — CUP TO DISC RATIO
OS_RATIO: 0.25
OD_RATIO: 0.25

## 2021-12-02 ASSESSMENT — EXTERNAL EXAM - RIGHT EYE: OD_EXAM: NORMAL

## 2021-12-02 NOTE — PROGRESS NOTES
HPI  Kit Martin is a 66 year old male here for diabetic annual eye exam. Diagnosed with Type II Diabetes mellitus around 2014, with HbA1c 7.2 this summer.  Feels vision is not as clear as it used to be both eyes, just using reading glasses now.  History of flashes/floaters right eye that have subsided (were worse about 4 years ago).  No eye pain.    POH: Presbyopia, mostly over the counter readers   GTTs: None  PMH: Type II Diabetes mellitus, last HbA1c 7.2 -  07/08/2021    FH: Mother with glaucoma  SH: Non-smoker    Assessment & Plan       (E11.9) Type 2 diabetes mellitus without complication, without long-term current use of insulin (H) - Both Eyes  (primary encounter diagnosis)  Comment: No diabetic retinopathy.  Plan Discussed the importance of tight blood glucose, blood pressure, and cholesterol   control in the prevention of diabetic retinopathy. Recommend yearly dilated eye exam.      (H52.223) Regular astigmatism of both eyes - Both Eyes  (H52.4) Presbyopia of both eyes - Both Eyes  Comment: would benefit from small distance prescription   Plan: manifest refraction done and prescription for glasses given     (H25.13) Nuclear sclerotic cataract of both eyes - Both Eyes  Comment: mild not visually significant   Plan: follow    Complete documentation of historical and exam elements from today's encounter can be found in the full encounter summary report (not reduplicated in this progress note). I personally obtained the chief complaint(s) and history of present illness.  I have confirmed and edited as necessary the CC, HPI, PMH/PSH, social history, FMH, ROS, and exam/neuro findings as obtained by the technician or others. I have examined this patient myself and I personally viewed the image(s) and studies listed above and the documentation reflects my findings and interpretation.  I formulated and edited as necessary the assessment and plan and discussed the findings and management plan with the patient  and family.     Melanie Mata MD

## 2021-12-02 NOTE — NURSING NOTE
Chief Complaints and History of Present Illnesses   Patient presents with     COMPREHENSIVE EYE EXAM     Type 2 diabetes mellitus without complication, without long-term current use of insulin      Chief Complaint(s) and History of Present Illness(es)     COMPREHENSIVE EYE EXAM     Laterality: both eyes    Onset: gradual    Onset: years ago    Course: gradually worsening    Associated symptoms: flashes and floaters.  Negative for eye pain, dryness, tearing, photophobia, glare and haloes    Treatments tried: no treatments    Pain scale: 0/10    Comments: Type 2 diabetes mellitus without complication, without long-term current use of insulin               Comments     Pt states vision is a little worse since last visit.  Hx of flashes/floaters RE that have gone away.  No ocular medications.    DM 2  Lab Results       Component                Value               Date                       A1C                      7.2                 07/08/2021                 A1C                      6.4                 06/17/2020                 A1C                      7.0                 09/23/2019                 A1C                      7.2                 11/12/2018                 A1C                      7.1                 03/14/2018                 A1C                      6.2                 05/02/2011                 A1C                      7.1                 05/25/2010            BS were 141 this am.    JESSICA Iraheta December 2, 2021 9:31 AM

## 2022-01-15 ENCOUNTER — HEALTH MAINTENANCE LETTER (OUTPATIENT)
Age: 67
End: 2022-01-15

## 2022-01-30 DIAGNOSIS — E11.69 TYPE 2 DIABETES MELLITUS WITH OTHER SPECIFIED COMPLICATION, WITHOUT LONG-TERM CURRENT USE OF INSULIN (H): ICD-10-CM

## 2022-02-01 NOTE — TELEPHONE ENCOUNTER
"Former patient of ??? & has not established care with another provider.  Please assign refill request to covering provider per clinic standard process.    Routing refill request to provider for review/approval because:  Labs not current:  A1C  Patient needs to be seen because it has been more than 6 months since last office visit.  No PCP    Last Written Prescription Date:  1/5/22  Last Fill Quantity: 90,  # refills: 0   Last office visit provider:  7/8/21     Requested Prescriptions   Pending Prescriptions Disp Refills     metFORMIN (GLUCOPHAGE) 500 MG tablet [Pharmacy Med Name: METFORMIN  MG TABLET] 90 tablet 0     Sig: TAKE 2 TABS BY MOUTH DAILY WITH BREAKFAST AND 1 TAB DAILY WITH SUPPER. NEEDS APPOINTMENT.       Biguanide Agents Failed - 1/30/2022  9:31 AM        Failed - Patient has documented A1c within the specified period of time.     If HgbA1C is 8 or greater, it needs to be on file within the past 3 months.  If less than 8, must be on file within the past 6 months.     Recent Labs   Lab Test 07/08/21  0803   A1C 7.2*             Passed - Patient is age 10 or older        Passed - Patient's CR is NOT>1.4 OR Patient's EGFR is NOT<45 within past 12 mos.     Recent Labs   Lab Test 07/08/21  0803   GFRESTIMATED >60   GFRESTBLACK >60       Recent Labs   Lab Test 07/08/21  0803   CR 0.78             Passed - Patient does NOT have a diagnosis of CHF.        Passed - Medication is active on med list        Passed - Recent (6 mo) or future (30 days) visit within the authorizing provider's specialty     Patient had office visit in the last 6 months or has a visit in the next 30 days with authorizing provider or within the authorizing provider's specialty.  See \"Patient Info\" tab in inbasket, or \"Choose Columns\" in Meds & Orders section of the refill encounter.                 Maik Bell RN 02/01/22 9:09 AM  "

## 2022-02-25 ENCOUNTER — MYC MEDICAL ADVICE (OUTPATIENT)
Dept: FAMILY MEDICINE | Facility: CLINIC | Age: 67
End: 2022-02-25

## 2022-02-25 ENCOUNTER — OFFICE VISIT (OUTPATIENT)
Dept: FAMILY MEDICINE | Facility: CLINIC | Age: 67
End: 2022-02-25
Payer: COMMERCIAL

## 2022-02-25 VITALS
RESPIRATION RATE: 18 BRPM | HEIGHT: 67 IN | WEIGHT: 176.8 LBS | HEART RATE: 83 BPM | DIASTOLIC BLOOD PRESSURE: 77 MMHG | TEMPERATURE: 95.8 F | SYSTOLIC BLOOD PRESSURE: 133 MMHG | BODY MASS INDEX: 27.75 KG/M2

## 2022-02-25 DIAGNOSIS — D64.9 LOW HEMOGLOBIN: ICD-10-CM

## 2022-02-25 DIAGNOSIS — M54.2 NECK PAIN: ICD-10-CM

## 2022-02-25 DIAGNOSIS — E11.9 TYPE 2 DIABETES, HBA1C GOAL < 7% (H): Primary | ICD-10-CM

## 2022-02-25 DIAGNOSIS — E78.00 PURE HYPERCHOLESTEROLEMIA: ICD-10-CM

## 2022-02-25 DIAGNOSIS — E78.00 PURE HYPERCHOLESTEROLEMIA, UNSPECIFIED: ICD-10-CM

## 2022-02-25 PROBLEM — R82.991 HYPOCITRATURIA: Status: ACTIVE | Noted: 2018-02-05

## 2022-02-25 PROBLEM — F41.1 ANXIETY STATE: Status: ACTIVE | Noted: 2022-02-25

## 2022-02-25 PROBLEM — E66.3 OVERWEIGHT (BMI 25.0-29.9): Status: ACTIVE | Noted: 2022-02-25

## 2022-02-25 LAB
ALBUMIN SERPL-MCNC: 4.1 G/DL (ref 3.5–5)
ALP SERPL-CCNC: 44 U/L (ref 45–120)
ALT SERPL W P-5'-P-CCNC: 34 U/L (ref 0–45)
ANION GAP SERPL CALCULATED.3IONS-SCNC: 11 MMOL/L (ref 5–18)
AST SERPL W P-5'-P-CCNC: 29 U/L (ref 0–40)
BASOPHILS # BLD AUTO: 0.1 10E3/UL (ref 0–0.2)
BASOPHILS NFR BLD AUTO: 1 %
BILIRUB SERPL-MCNC: 0.6 MG/DL (ref 0–1)
BUN SERPL-MCNC: 8 MG/DL (ref 8–22)
CALCIUM SERPL-MCNC: 9.7 MG/DL (ref 8.5–10.5)
CHLORIDE BLD-SCNC: 100 MMOL/L (ref 98–107)
CO2 SERPL-SCNC: 28 MMOL/L (ref 22–31)
CREAT SERPL-MCNC: 0.79 MG/DL (ref 0.7–1.3)
EOSINOPHIL # BLD AUTO: 0.4 10E3/UL (ref 0–0.7)
EOSINOPHIL NFR BLD AUTO: 6 %
ERYTHROCYTE [DISTWIDTH] IN BLOOD BY AUTOMATED COUNT: 15.2 % (ref 10–15)
GFR SERPL CREATININE-BSD FRML MDRD: >90 ML/MIN/1.73M2
GLUCOSE BLD-MCNC: 116 MG/DL (ref 70–125)
HBA1C MFR BLD: 6.7 % (ref 0–5.6)
HCT VFR BLD AUTO: 37.2 % (ref 40–53)
HGB BLD-MCNC: 11.8 G/DL (ref 13.3–17.7)
IMM GRANULOCYTES # BLD: 0 10E3/UL
IMM GRANULOCYTES NFR BLD: 0 %
LYMPHOCYTES # BLD AUTO: 2.3 10E3/UL (ref 0.8–5.3)
LYMPHOCYTES NFR BLD AUTO: 34 %
MCH RBC QN AUTO: 26 PG (ref 26.5–33)
MCHC RBC AUTO-ENTMCNC: 31.7 G/DL (ref 31.5–36.5)
MCV RBC AUTO: 82 FL (ref 78–100)
MONOCYTES # BLD AUTO: 0.5 10E3/UL (ref 0–1.3)
MONOCYTES NFR BLD AUTO: 8 %
NEUTROPHILS # BLD AUTO: 3.3 10E3/UL (ref 1.6–8.3)
NEUTROPHILS NFR BLD AUTO: 50 %
PLATELET # BLD AUTO: 337 10E3/UL (ref 150–450)
POTASSIUM BLD-SCNC: 3.7 MMOL/L (ref 3.5–5)
PROT SERPL-MCNC: 6.9 G/DL (ref 6–8)
RBC # BLD AUTO: 4.54 10E6/UL (ref 4.4–5.9)
SODIUM SERPL-SCNC: 139 MMOL/L (ref 136–145)
WBC # BLD AUTO: 6.6 10E3/UL (ref 4–11)

## 2022-02-25 PROCEDURE — 85025 COMPLETE CBC W/AUTO DIFF WBC: CPT | Performed by: FAMILY MEDICINE

## 2022-02-25 PROCEDURE — 83550 IRON BINDING TEST: CPT | Performed by: FAMILY MEDICINE

## 2022-02-25 PROCEDURE — 99214 OFFICE O/P EST MOD 30 MIN: CPT | Performed by: FAMILY MEDICINE

## 2022-02-25 PROCEDURE — 80053 COMPREHEN METABOLIC PANEL: CPT | Performed by: FAMILY MEDICINE

## 2022-02-25 PROCEDURE — 83036 HEMOGLOBIN GLYCOSYLATED A1C: CPT | Performed by: FAMILY MEDICINE

## 2022-02-25 PROCEDURE — 36415 COLL VENOUS BLD VENIPUNCTURE: CPT | Performed by: FAMILY MEDICINE

## 2022-02-25 RX ORDER — PRAVASTATIN SODIUM 20 MG
TABLET ORAL
Qty: 90 TABLET | Refills: 1 | Status: SHIPPED | OUTPATIENT
Start: 2022-02-25 | End: 2022-10-12

## 2022-02-25 ASSESSMENT — PATIENT HEALTH QUESTIONNAIRE - PHQ9: SUM OF ALL RESPONSES TO PHQ QUESTIONS 1-9: 0

## 2022-02-25 NOTE — PATIENT INSTRUCTIONS
Patient Education     Head Tilt / Upper Trapezius Stretch (Flexibility)    1. Sit up straight in a chair with your head and neck in a neutral position, ears in line with shoulders. Hold the edge of your chair seat with your right hand. Tuck your chin in slightly.  2. Tilt your head to the left, while looking straight ahead.  3. Put your left hand on the right side of your head. Gently pull your head to the left. Hold for 30 to 60 seconds. Use gentle pressure to increase the stretch. Don t force your head into position.  4. Return your head and neck to the neutral position.  5. Repeat this exercise 2 to 3 times, or as instructed.  6. Switch sides and repeat 2 to 3 times, or as instructed.   Challenge yourself  Tuck one end of a towel under your left arm. Then bring the other end over your right shoulder. Pull the towel down on your right shoulder with both hands as you side-bend your head to the left. Repeat with the other side.   Ollie last reviewed this educational content on 3/1/2020    0895-0099 [employer]. All rights reserved. This information is not intended as a substitute for professional medical care. Always follow your healthcare professional's instructions.

## 2022-02-25 NOTE — PROGRESS NOTES
"  Assessment & Plan     ICD-10-CM    1. TYPE 2 DIABETES, HBA1C GOAL < 7%  E11.9 Hemoglobin A1c     Hemoglobin A1c   2. Pure hypercholesterolemia, unspecified  E78.00 pravastatin (PRAVACHOL) 20 MG tablet   3. Pure hypercholesterolemia  E78.00 Comprehensive metabolic panel (BMP + Alb, Alk Phos, ALT, AST, Total. Bili, TP)   4. Neck pain  M54.2 Physical Therapy Referral     Medication making: Patient is here today for follow-up of his diabetes and hyperlipidemia.  He is on chlorthalidone for kidney stones rather than hypertension.    He has a new onset of neck pain for the last 2 3 days.  He does not have any injury that he can remember off.  He has been shoveling snow.  He started doing certain exercises that are helpful.  He is interested in physical therapy and acupuncture and a referral for physical therapy has been done.  Regarding acupuncture, he can schedule and we can do a referral at his choice.    Regarding hyperlipidemia: He is on pravastatin.  Discussed ideally a moderate dose statin like atorvastatin or increased dose of pravastatin is indicated with goal of LDL less than 70.  Patient has been off his medication for the last few days.  Due to this reason we will not check labs today.  We will check it at next visit and consider changing medication.  For now he can continue on pravastatin.    At the time of documentation, I do note that he has a diagnosis of low hemoglobin.  We will check a CBC to make sure hemoglobin A1c is accurately represented.  I will patient regarding the same.      BMI:   Estimated body mass index is 27.69 kg/m  as calculated from the following:    Height as of this encounter: 1.702 m (5' 7\").    Weight as of this encounter: 80.2 kg (176 lb 12.8 oz).       MEDICATIONS:  Continue current medications without change  Regular exercise  See Patient Instructions    Return in about 6 months (around 8/25/2022) for Routine preventive.    Cheyenne Brantley MD  [facility] " [facility]    Subjective    Chief Complaint   Patient presents with     [facility]     DM check        Kit is a 66 year old who presents for the following health issues     History of Present Illness       Back Pain:  He presents for follow up of back pain. Patient's back pain is a new problem.    Original cause of back pain: not sure  First noticed back pain: yesterday  Patient feels back pain: constantlyLocation of back pain:  Right upper back and right side of neck  Description of back pain: sharp  Back pain spreads: right side of neck    Since patient first noticed back pain, pain is: gradually improving  Does back pain interfere with his job:  Yes  On a scale of 1-10 (10 being the worst), patient describes pain as:  8  What makes back pain worse: other  Acupuncture: not tried  Acetaminophen: not tried  Activity or exercise: helpful  Chiropractor:  Not tried  Cold: not tried  Heat: helpful  Massage: helpful  Muscle relaxants: helpful  NSAIDS: helpful  Opioids: not tried  Physical Therapy: not tried  Rest: not tried  Steroid Injection: not tried  Stretching: helpful  Surgery: not tried  TENS unit: not tried  Topical pain relievers: not tried  Other healthcare providers patient is seeing for back pain: None    He eats 2-3 servings of fruits and vegetables daily.He consumes 0 sweetened beverage(s) daily.He exercises with enough effort to increase his heart rate 60 or more minutes per day.  He exercises with enough effort to increase his heart rate 7 days per week.   He is taking medications regularly.     Patient Active Problem List   Diagnosis     Hyperlipidemia LDL goal <100     TYPE 2 DIABETES, HBA1C GOAL < 7%     Mild major depression (H)     Anemia     Anxiety state     Hypercalciuria     Hypocitraturia     Leukocytosis     Overweight (BMI 25.0-29.9)     Urinary tract stones     Current Outpatient Medications   Medication     aspirin 81 MG tablet     BLOOD GLUCOSE TEST STRIPS STRP     chlorthalidone  "(HYGROTON) 25 MG tablet     metFORMIN (GLUCOPHAGE) 500 MG tablet     ONETOUCH ULTRA test strip     POTASSIUM CITRATE PO     pravastatin (PRAVACHOL) 20 MG tablet     No current facility-administered medications for this visit.       Review of Systems   Constitutional, HEENT, cardiovascular, pulmonary, gi and gu systems are negative, except as otherwise noted.      Objective    /77   Pulse 83   Temp (!) 95.8  F (35.4  C) (Oral)   Resp 18   Ht 1.702 m (5' 7\")   Wt 80.2 kg (176 lb 12.8 oz)   BMI 27.69 kg/m    Body mass index is 27.69 kg/m .  Physical Exam   GENERAL: healthy, alert and no distress  NECK: no adenopathy, no asymmetry, masses, or scars and thyroid normal to palpation  RESP: lungs clear to auscultation - no rales, rhonchi or wheezes  CV: regular rate and rhythm, normal S1 S2, no S3 or S4, no murmur, click or rub, no peripheral edema and peripheral pulses strong  ABDOMEN: soft, nontender, no hepatosplenomegaly, no masses and bowel sounds normal  MS: spine exam shows ROM is normal and straight and there is diffuse tenderness on the trapezius.  No point tenderness noted.  No cervical spine tenderness.  Range of motion of shoulder is adequate.  Impingement sign is negative.    Results for orders placed or performed in visit on 02/25/22 (from the past 24 hour(s))   Hemoglobin A1c   Result Value Ref Range    Hemoglobin A1C 6.7 (H) 0.0 - 5.6 %               "

## 2022-02-26 DIAGNOSIS — D64.9 LOW HEMOGLOBIN: Primary | ICD-10-CM

## 2022-02-28 LAB
IRON SATN MFR SERPL: 13 % (ref 15–46)
IRON SERPL-MCNC: 56 UG/DL (ref 35–180)
TIBC SERPL-MCNC: 427 UG/DL (ref 240–430)

## 2022-03-09 ENCOUNTER — HOSPITAL ENCOUNTER (OUTPATIENT)
Dept: PHYSICAL THERAPY | Facility: REHABILITATION | Age: 67
End: 2022-03-09
Attending: FAMILY MEDICINE
Payer: COMMERCIAL

## 2022-03-09 DIAGNOSIS — M62.81 MUSCLE WEAKNESS (GENERALIZED): ICD-10-CM

## 2022-03-09 DIAGNOSIS — R29.898 DECREASED RANGE OF MOTION OF NECK: Primary | ICD-10-CM

## 2022-03-09 DIAGNOSIS — M54.2 ACUTE NECK PAIN: ICD-10-CM

## 2022-03-09 PROCEDURE — 97161 PT EVAL LOW COMPLEX 20 MIN: CPT | Mod: GP | Performed by: PHYSICAL THERAPIST

## 2022-03-09 PROCEDURE — 97530 THERAPEUTIC ACTIVITIES: CPT | Mod: GP | Performed by: PHYSICAL THERAPIST

## 2022-03-09 PROCEDURE — 97110 THERAPEUTIC EXERCISES: CPT | Mod: GP | Performed by: PHYSICAL THERAPIST

## 2022-03-09 NOTE — DISCHARGE INSTRUCTIONS
Try to continue with taking ibuprofen consistently over the next 3-4 days to help get rid of inflammation - always with food and watch for stomach sensitivity    [place] with elevating the head of your bed to help get a better night's sleep    Try these exercises      Gently bend head forward as far as feels good x5-10 reps 2-3x/day      Gently use towel or pillow case to help you slightly extend backwards - only as far as feels good  x5-10 reps 2-3x/day      Gently use towel to twist neck to the side x5-10 reps only as far as feels good 2-3x/day        Chin tuck - gently pull chin back x5-10 reps only as far as comfortable      Hold on to edge of chair and lean head to the left and twist head to feel a good stretch on right side x5-10 seconds x2-3 reps
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
## 2022-03-16 ENCOUNTER — HOSPITAL ENCOUNTER (OUTPATIENT)
Dept: PHYSICAL THERAPY | Facility: REHABILITATION | Age: 67
Discharge: HOME | End: 2022-03-16
Payer: COMMERCIAL

## 2022-03-16 DIAGNOSIS — M54.2 ACUTE NECK PAIN: Primary | ICD-10-CM

## 2022-03-16 PROCEDURE — 97110 THERAPEUTIC EXERCISES: CPT | Mod: GP

## 2022-03-16 PROCEDURE — 97140 MANUAL THERAPY 1/> REGIONS: CPT | Mod: GP

## 2022-03-23 ENCOUNTER — HOSPITAL ENCOUNTER (OUTPATIENT)
Dept: PHYSICAL THERAPY | Facility: REHABILITATION | Age: 67
Discharge: HOME | End: 2022-03-23
Payer: COMMERCIAL

## 2022-03-23 DIAGNOSIS — M62.81 MUSCLE WEAKNESS (GENERALIZED): ICD-10-CM

## 2022-03-23 DIAGNOSIS — R29.898 DECREASED RANGE OF MOTION OF NECK: ICD-10-CM

## 2022-03-23 DIAGNOSIS — M54.2 ACUTE NECK PAIN: Primary | ICD-10-CM

## 2022-03-23 PROCEDURE — 97110 THERAPEUTIC EXERCISES: CPT | Mod: GP | Performed by: PHYSICAL THERAPIST

## 2022-03-23 NOTE — DISCHARGE INSTRUCTIONS
Stretch over back of chair OR USE FOAM ROLLER      Roll back and forth a couple of times to push pressure and blood flow into tight muscles - THEN try to extend backwards over the roller 3-4 levels in the upper back x5-10 seconds       Reach forward past bottom arm for stretch into shoulder blade from spine and then roll backwards with arms only (leaving hips stacked) and stretch arm and shoulder blade backwards behind you x3-5 reps - can do at wall during work

## 2022-04-13 NOTE — ADDENDUM NOTE
Encounter addended by: Jenelle Johnson, PT on: 4/13/2022 2:44 PM   Actions taken: Episode resolved, Clinical Note Signed, Flowsheet accepted

## 2022-04-13 NOTE — PROGRESS NOTES
Discharge Note    Pt canceled last 2 follow up appointments as he is had resolution of his sx and is feeling good with his HEP.  Discharge to  with HEP. Thank you for this referral!       03/23/22 0900   Signing Clinician's Name / Credentials   Signing clinician's name / credentials Jenelle Johnson PT, DPT, OCS, CLT   Session Number   Session Number 3/12   Adult Goals   PT Ortho Eval Goals 1;2;3   Ortho Goal 1   Goal Description Pt will be able to perform at work station with 2 monitors without increase in neck pain for improved quality of work in 12 weeks.   Goal Progress IMPROVING - pt is turning himself towards his monitors and that has felt helpful   Target Date 06/01/22   Ortho Goal 2   Goal Description Pt will be able to drive without pain in neck and shoulder for improved safety with driving in 12 weeks.   Goal Progress IMPROVING - pt can still get pain with driving but not as intense   Target Date 06/01/22   Ortho Goal 3   Goal Description Pt will be able to sleep without needing medication or a patch to not have neck pain interrupt sleeping in 12 weeks for improved quality of sleep.   Goal Progress IMPROVING - getting better but still using something like icy hot to help fall asleep   Target Date 06/01/22   Subjective Report   Subjective Report Pt reports pain rating with driving 4-5/10 but sitting in clinic today 1/10. Pain intensity has calmed down a lot but pt can still have pain with it. Pt reports he did ibuprofen for 5-6 days and then stopped taking it and the pain that came back was bareable and pt hasn't had to take any more of it. Pt will help neck stretch with hands and that help pain in the car.   Objective Measures   Objective Measures Objective Measure 1;Objective Measure 2   Objective Measure 1   Objective Measure Neck ROM   Details Cervical flex 50 degrees with stretch only (was painful down to R shoulder blade), ext 20 degrees with increased T1-2 pain (was 15 with pain), R rotation 64 degrees  with 1/10 pain into R shoulder blade (was 64 without pain), L rotation 66 degrees without pain (was 62 with min pain), R SB 7 cm ear to acromion (was 8 cm), L SB 7 cm (was 9 cm)   Treatment Interventions   Interventions Therapeutic Procedure/Exercise;Therapeutic Activity;Manual Therapy   Therapeutic Procedure/exercise   Therapeutic Procedures: strength, endurance, ROM, flexibillity minutes (90771) 26   Skilled Intervention Assessed reponse to HEP and activity levels since last visit. Reassessed cervical ROM and discussed progress with cervical flexibility. Educated pt on importance of improved thoracic ROM to decrease sensitivity with cervical ROM - attempted thoracic extension mobility exercises and added them to HEP per pt instructions and printed AVS for home.   Patient Response No increase in pain after performing   Treatment Detail Performed foam rolling across mid to upper back and then multi-level thoracic extension with head support x4 levels throughout mid to upper back, s/l reach and roll arm stretch open/close x3 reps and then semi-[place] overhead x5 reps.   Education   Learner Patient   Readiness Eager   Method Booklet/handout   Response Verbalizes Understanding   Plan   Home program Gentle chin tuck supine or seated, AROM cervical flexion, towel assisted ROM cervical extension and rotation, R upper trap/lev scap stretch, pec stretch, latisimuss dorsi stretch, rhomboid stretch, scapular retraction/depression, thoracic extension over work chair or foam roller, s/l reach and roll with semi-[place] upper quarter stretch laying or up against wall during work day   Plan for next session Reassess ROM and progress ROM and strength exercises as able - add resistance band strengthening as able.   Comments   Comments Pt demo's nice improvements to cervical ROM and decreased pain intensity levels over past 2 weeks with HEP. Pt continues to have decreased cervical ROM and thoracic mobility and is still good candidate  for skilled PT services to decrease these remaining impairments and reach goals.    Total Session Time   Timed Code Treatment Minutes 26   Total Treatment Time (sum of timed and untimed services) 26   AMBULATORY CLINICS ONLY-MEDICAL AND TREATMENT DIAGNOSIS   Medical Diagnosis Neck pain   PT Diagnosis Acute neck pain with decreased ROM and cervical weakness

## 2022-05-02 DIAGNOSIS — E11.69 TYPE 2 DIABETES MELLITUS WITH OTHER SPECIFIED COMPLICATION, WITHOUT LONG-TERM CURRENT USE OF INSULIN (H): ICD-10-CM

## 2022-05-06 NOTE — TELEPHONE ENCOUNTER
"Routing refill request to provider for review/approval because:  No established PCP    Last Written Prescription Date:  2/1/22  Last Fill Quantity: 270,  # refills: 0   Last office visit provider:  2/25/22     Requested Prescriptions   Pending Prescriptions Disp Refills     metFORMIN (GLUCOPHAGE) 500 MG tablet 270 tablet 2     Sig: TAKE 2 TABS BY MOUTH DAILY WITH BREAKFAST AND 1 TAB DAILY WITH SUPPER. NEEDS APPOINTMENT.       Biguanide Agents Passed - 5/2/2022  3:53 PM        Passed - Patient is age 10 or older        Passed - Patient has documented A1c within the specified period of time.     If HgbA1C is 8 or greater, it needs to be on file within the past 3 months.  If less than 8, must be on file within the past 6 months.     Recent Labs   Lab Test 02/25/22  0910   A1C 6.7*             Passed - Patient's CR is NOT>1.4 OR Patient's EGFR is NOT<45 within past 12 mos.     Recent Labs   Lab Test 02/25/22  0910 07/08/21  0803   GFRESTIMATED >90 >60   GFRESTBLACK  --  >60       Recent Labs   Lab Test 02/25/22  0910   CR 0.79             Passed - Patient does NOT have a diagnosis of CHF.        Passed - Medication is active on med list        Passed - Recent (6 mo) or future (30 days) visit within the authorizing provider's specialty     Patient had office visit in the last 6 months or has a visit in the next 30 days with authorizing provider or within the authorizing provider's specialty.  See \"Patient Info\" tab in inbasket, or \"Choose Columns\" in Meds & Orders section of the refill encounter.                 Emma Nielson 05/05/22 7:06 PM  "

## 2022-06-21 ENCOUNTER — TELEPHONE (OUTPATIENT)
Dept: UROLOGY | Facility: CLINIC | Age: 67
End: 2022-06-21

## 2022-06-21 NOTE — TELEPHONE ENCOUNTER
[facility] Call Center    Phone Message    May a detailed message be left on voicemail: yes     Reason for Call: Other: Pt called stating that he would like to leave a message for [place], but he did not feel comfortable stating what. Please call pt to discuss. Thanks     Action Taken: Other: [facility]    Travel Screening: Not Applicable

## 2022-08-04 DIAGNOSIS — N20.9 URINARY CALCULUS, UNSPECIFIED: ICD-10-CM

## 2022-08-05 RX ORDER — CHLORTHALIDONE 25 MG/1
TABLET ORAL
Qty: 90 TABLET | Refills: 3 | Status: SHIPPED | OUTPATIENT
Start: 2022-08-05 | End: 2022-10-12

## 2022-08-05 NOTE — TELEPHONE ENCOUNTER
"Routing refill request to provider for review/approval because:  Medication is reported/historical  chlorthalidone (HYGROTEN) 25 MG tablet 90 tablet 3 7/8/2021  No   Sig - Route: Take 1 tablet (25 mg total) by mouth daily. - Oral   Sent to pharmacy as: chlorthalidone 25 mg tablet (HYGROTEN)   Notes to Pharmacy: Will call when needed.   E-Prescribing Status: Receipt confirmed by pharmacy (7/8/2021  7:55 AM CDT)       chlorthalidone (HYGROTEN) 25 MG tablet [[id number]]    Electronically signed by: Paula eRcio MD on 07/08/21 0755 Status: Active   Ordering user: Paula Recio MD 07/08/21 0755 Authorized by: Paula Recio MD   Frequency: DAILY 07/08/21 - Until Discontinued Released by: Paula Recio MD 07/08/21 0755   Diagnoses  Urinary tract stones [N20.9]   Medication comments: Will call when needed.       Last Written Prescription Date:    Last Fill Quantity: ,  # refills:    Last office visit provider:  2/25/22     Requested Prescriptions   Pending Prescriptions Disp Refills     chlorthalidone (HYGROTON) 25 MG tablet [Pharmacy Med Name: CHLORTHALIDONE 25 MG TABLET] 90 tablet 3     Sig: TAKE 1 TABLET BY MOUTH EVERY DAY       Diuretics (Including Combos) Protocol Passed - 8/4/2022  7:44 AM        Passed - Blood pressure under 140/90 in past 12 months     BP Readings from Last 3 Encounters:   02/25/22 133/77   07/08/21 122/70   07/03/20 116/68                 Passed - Recent (12 mo) or future (30 days) visit within the authorizing provider's specialty     Patient has had an office visit with the authorizing provider or a provider within the authorizing providers department within the previous 12 mos or has a future within next 30 days. See \"Patient Info\" tab in inbasket, or \"Choose Columns\" in Meds & Orders section of the refill encounter.              Passed - Medication is active on med list        Passed - Patient is age 18 or older        Passed - Normal serum creatinine on file in past 12 months     " Recent Labs   Lab Test 02/25/22  0910   CR 0.79              Passed - Normal serum potassium on file in past 12 months     Recent Labs   Lab Test 02/25/22  0910   POTASSIUM 3.7                    Passed - Normal serum sodium on file in past 12 months     Recent Labs   Lab Test 02/25/22  0910                      Patience Langford, RN 08/04/22 8:32 PM

## 2022-08-27 ENCOUNTER — HEALTH MAINTENANCE LETTER (OUTPATIENT)
Age: 67
End: 2022-08-27

## 2022-09-25 DIAGNOSIS — E11.9 TYPE 2 DIABETES MELLITUS WITHOUT COMPLICATION, WITHOUT LONG-TERM CURRENT USE OF INSULIN (H): ICD-10-CM

## 2022-09-26 NOTE — TELEPHONE ENCOUNTER
"Former patient of Recio & has not established care with another provider.  Please assign refill request to covering provider per clinic standard process.      Last Written Prescription Date:  6/27/22  Last Fill Quantity: 90,  # refills: 0   Last office visit provider:  2/25/22     Requested Prescriptions   Pending Prescriptions Disp Refills     ASPIRIN LOW DOSE 81 MG EC tablet [Pharmacy Med Name: ASPIRIN EC 81 MG TABLET] 90 tablet 0     Sig: TAKE 1 TABLET BY MOUTH EVERY DAY       Analgesics (Non-Narcotic Tylenol and ASA Only) Passed - 9/25/2022  8:11 AM        Passed - Recent (12 mo) or future (30 days) visit within the authorizing provider's specialty     Patient has had an office visit with the authorizing provider or a provider within the authorizing providers department within the previous 12 mos or has a future within next 30 days. See \"Patient Info\" tab in inbasket, or \"Choose Columns\" in Meds & Orders section of the refill encounter.              Passed - Patient is age 20 years or older     If ASA is flagged for ages under 20 years old. Forward to provider for confirmation Ryes Syndrome is not a concern.              Passed - Medication is active on med list             Patience Langford RN 09/25/22 7:20 PM  "

## 2022-09-27 RX ORDER — ASPIRIN 81 MG/1
TABLET, COATED ORAL
Qty: 90 TABLET | Refills: 0 | Status: SHIPPED | OUTPATIENT
Start: 2022-09-27 | End: 2022-10-12

## 2022-10-07 ASSESSMENT — ENCOUNTER SYMPTOMS
WEAKNESS: 0
SORE THROAT: 0
HEADACHES: 0
JOINT SWELLING: 0
EYE PAIN: 0
HEMATOCHEZIA: 0
DIARRHEA: 0
HEARTBURN: 0
DIZZINESS: 0
PARESTHESIAS: 0
PALPITATIONS: 0
FEVER: 0
CONSTIPATION: 1
MYALGIAS: 0
NAUSEA: 0
NERVOUS/ANXIOUS: 0
CHILLS: 0
FREQUENCY: 0
ARTHRALGIAS: 0
COUGH: 0
ABDOMINAL PAIN: 0
HEMATURIA: 0
DYSURIA: 0
SHORTNESS OF BREATH: 0

## 2022-10-07 ASSESSMENT — ACTIVITIES OF DAILY LIVING (ADL): CURRENT_FUNCTION: NO ASSISTANCE NEEDED

## 2022-10-12 ENCOUNTER — OFFICE VISIT (OUTPATIENT)
Dept: FAMILY MEDICINE | Facility: CLINIC | Age: 67
End: 2022-10-12
Payer: COMMERCIAL

## 2022-10-12 VITALS
WEIGHT: 176 LBS | HEIGHT: 67 IN | DIASTOLIC BLOOD PRESSURE: 70 MMHG | OXYGEN SATURATION: 97 % | SYSTOLIC BLOOD PRESSURE: 126 MMHG | BODY MASS INDEX: 27.62 KG/M2 | HEART RATE: 86 BPM

## 2022-10-12 DIAGNOSIS — E11.69 TYPE 2 DIABETES MELLITUS WITH OTHER SPECIFIED COMPLICATION, WITHOUT LONG-TERM CURRENT USE OF INSULIN (H): ICD-10-CM

## 2022-10-12 DIAGNOSIS — E78.00 PURE HYPERCHOLESTEROLEMIA, UNSPECIFIED: ICD-10-CM

## 2022-10-12 DIAGNOSIS — Z13.220 SCREENING FOR HYPERLIPIDEMIA: ICD-10-CM

## 2022-10-12 DIAGNOSIS — Z00.00 ENCOUNTER FOR MEDICARE ANNUAL WELLNESS EXAM: Primary | ICD-10-CM

## 2022-10-12 DIAGNOSIS — E11.9 TYPE 2 DIABETES MELLITUS WITHOUT COMPLICATION, WITHOUT LONG-TERM CURRENT USE OF INSULIN (H): ICD-10-CM

## 2022-10-12 DIAGNOSIS — Z12.5 SCREENING FOR PROSTATE CANCER: ICD-10-CM

## 2022-10-12 DIAGNOSIS — N20.0 CALCULUS OF KIDNEY: ICD-10-CM

## 2022-10-12 DIAGNOSIS — E11.9 TYPE 2 DIABETES, HBA1C GOAL < 7% (H): ICD-10-CM

## 2022-10-12 PROCEDURE — 99397 PER PM REEVAL EST PAT 65+ YR: CPT | Performed by: STUDENT IN AN ORGANIZED HEALTH CARE EDUCATION/TRAINING PROGRAM

## 2022-10-12 PROCEDURE — 99214 OFFICE O/P EST MOD 30 MIN: CPT | Mod: 25 | Performed by: STUDENT IN AN ORGANIZED HEALTH CARE EDUCATION/TRAINING PROGRAM

## 2022-10-12 RX ORDER — CHLORTHALIDONE 25 MG/1
25 TABLET ORAL DAILY
Qty: 90 TABLET | Refills: 3 | Status: SHIPPED | OUTPATIENT
Start: 2022-10-12 | End: 2023-10-13

## 2022-10-12 RX ORDER — PRAVASTATIN SODIUM 20 MG
TABLET ORAL
Qty: 90 TABLET | Refills: 3 | Status: SHIPPED | OUTPATIENT
Start: 2022-10-12 | End: 2023-10-13

## 2022-10-12 ASSESSMENT — ANXIETY QUESTIONNAIRES
3. WORRYING TOO MUCH ABOUT DIFFERENT THINGS: NOT AT ALL
GAD7 TOTAL SCORE: 0
5. BEING SO RESTLESS THAT IT IS HARD TO SIT STILL: NOT AT ALL
4. TROUBLE RELAXING: NOT AT ALL
GAD7 TOTAL SCORE: 0
7. FEELING AFRAID AS IF SOMETHING AWFUL MIGHT HAPPEN: NOT AT ALL
8. IF YOU CHECKED OFF ANY PROBLEMS, HOW DIFFICULT HAVE THESE MADE IT FOR YOU TO DO YOUR WORK, TAKE CARE OF THINGS AT HOME, OR GET ALONG WITH OTHER PEOPLE?: NOT DIFFICULT AT ALL
1. FEELING NERVOUS, ANXIOUS, OR ON EDGE: NOT AT ALL
IF YOU CHECKED OFF ANY PROBLEMS ON THIS QUESTIONNAIRE, HOW DIFFICULT HAVE THESE PROBLEMS MADE IT FOR YOU TO DO YOUR WORK, TAKE CARE OF THINGS AT HOME, OR GET ALONG WITH OTHER PEOPLE: NOT DIFFICULT AT ALL
6. BECOMING EASILY ANNOYED OR IRRITABLE: NOT AT ALL
GAD7 TOTAL SCORE: 0
2. NOT BEING ABLE TO STOP OR CONTROL WORRYING: NOT AT ALL
7. FEELING AFRAID AS IF SOMETHING AWFUL MIGHT HAPPEN: NOT AT ALL

## 2022-10-12 ASSESSMENT — ENCOUNTER SYMPTOMS
NERVOUS/ANXIOUS: 0
SHORTNESS OF BREATH: 0
SORE THROAT: 0
HEMATURIA: 0
WEAKNESS: 0
HEADACHES: 0
PALPITATIONS: 0
DIARRHEA: 0
DIZZINESS: 0
HEARTBURN: 0
MYALGIAS: 0
PARESTHESIAS: 0
JOINT SWELLING: 0
CONSTIPATION: 1
HEMATOCHEZIA: 0
NAUSEA: 0
EYE PAIN: 0
FEVER: 0
ABDOMINAL PAIN: 0
CHILLS: 0
COUGH: 0
ARTHRALGIAS: 0
DYSURIA: 0
FREQUENCY: 0

## 2022-10-12 ASSESSMENT — PATIENT HEALTH QUESTIONNAIRE - PHQ9
10. IF YOU CHECKED OFF ANY PROBLEMS, HOW DIFFICULT HAVE THESE PROBLEMS MADE IT FOR YOU TO DO YOUR WORK, TAKE CARE OF THINGS AT HOME, OR GET ALONG WITH OTHER PEOPLE: NOT DIFFICULT AT ALL
SUM OF ALL RESPONSES TO PHQ QUESTIONS 1-9: 0
SUM OF ALL RESPONSES TO PHQ QUESTIONS 1-9: 0

## 2022-10-12 ASSESSMENT — ACTIVITIES OF DAILY LIVING (ADL): CURRENT_FUNCTION: NO ASSISTANCE NEEDED

## 2022-10-12 NOTE — PROGRESS NOTES
"Answers for HPI/ROS submitted by the patient on 10/12/2022  If you checked off any problems, how difficult have these problems made it for you to do your work, take care of things at home, or get along with other people?: Not difficult at all  PHQ9 TOTAL SCORE: 0  UMA 7 TOTAL SCORE: 0      SUBJECTIVE:   Kit is a 67 year old who presents for Preventive Visit.      Patient has been advised of split billing requirements and indicates understanding: Yes  Are you in the first 12 months of your Medicare coverage?  Yes,  Visual Acuity:  Right Eye: 20/25   Left Eye: 20/40  Both Eyes: 20/25  Has eye appointment coming up    Healthy Habits:     In general, how would you rate your overall health?  Good    Frequency of exercise:  4-5 days/week    Duration of exercise:  15-30 minutes    Do you usually eat at least 4 servings of fruit and vegetables a day, include whole grains    & fiber and avoid regularly eating high fat or \"junk\" foods?  Yes    Taking medications regularly:  Yes    Medication side effects:  None    Ability to successfully perform activities of daily living:  No assistance needed    Home Safety:  No safety concerns identified    Hearing Impairment:  No hearing concerns    In the past 6 months, have you been bothered by leaking of urine?  No    In general, how would you rate your overall mental or emotional health?  Fair      PHQ-2 Total Score: 0    Additional concerns today:  No    DMII   - on metformin 3 tablets/day, but he decreased recently to 2 tablets in the evening, made this change two weeks ago when his numbers improved  - BG at home have been improved with dietary adjustments. His daughter helped him adjust diet recently 2 weeks ago  - recent -120s  - 2 hour post-prandial:    - he is hoping to get off of metformin.   - walks [phone] steps per day and he added a higher intensity elliptical       Family hx of CVA  (dad and brother, MU, PU) and MI (brother, mother)    Last colonoscopy in " 2014 - due 10 years      Hx of kidney stones - started on chlorthalidone by urologist   He is also on Ayurvedic       Hx of anxiety - especially claustrophobia     Never smoker     Lab Results   Component Value Date    A1C 6.7 02/25/2022    A1C 7.2 07/08/2021    A1C 6.4 06/17/2020    A1C 7.0 09/23/2019    A1C 7.2 11/12/2018    A1C 6.2 05/02/2011    A1C 7.1 05/25/2010       Do you feel safe in your environment? No    Have you ever done Advance Care Planning? (For example, a Health Directive, POLST, or a discussion with a medical provider or your loved ones about your wishes): No, advance care planning information given to patient to review.  Patient declined advance care planning discussion at this time.      Fall risk  Fallen 2 or more times in the past year?: No  Any fall with injury in the past year?: No    Cognitive Screening   1) Repeat 3 items (Leader, Season, Table)    2) Clock draw: NORMAL  3) 3 item recall: Recalls 3 objects  Results: 3 items recalled: COGNITIVE IMPAIRMENT LESS LIKELY    Mini-CogTM Copyright S Michele. Licensed by the author for use in [facility]; reprinted with permission (soob@.[email or web address]). All rights reserved.      Do you have sleep apnea, excessive snoring or daytime drowsiness?: no    Reviewed and updated as needed this visit by clinical staff                  Reviewed and updated as needed this visit by Provider                 Social History     Tobacco Use     Smoking status: Never     Smokeless tobacco: Never   Substance Use Topics     Alcohol use: Yes     Comment: Alcoholic Drinks/day: Less than weekly.     If you drink alcohol do you typically have >3 drinks per day or >7 drinks per week? No    Alcohol Use 10/7/2022   Prescreen: >3 drinks/day or >7 drinks/week? No       Current providers sharing in care for this patient include:   Patient Care Team:  Cl[facility] - [facility] - Melanie Villegas MD as MD (Ophthalmology)  Adolfo  Melanie Ni MD as Assigned Surgical Provider  Cheyenne Brantley MD as Assigned PCP    The following health maintenance items are reviewed in Epic and correct as of today:  Health Maintenance   Topic Date Due     ANNUAL REVIEW OF HM ORDERS  Never done     ADVANCE CARE PLANNING  Never done     DEPRESSION ACTION PLAN  Never done     Pneumococcal Vaccine: 65+ Years (1 - PCV) Never done     ZOSTER IMMUNIZATION (1 of 2) Never done     MEDICARE ANNUAL WELLNESS VISIT  07/08/2022     LIPID  07/08/2022     MICROALBUMIN  07/08/2022     A1C  08/25/2022     EYE EXAM  12/02/2022     BMP  02/25/2023     DIABETIC FOOT EXAM  02/25/2023     PHQ-9  04/12/2023     FALL RISK ASSESSMENT  10/12/2023     COLORECTAL CANCER SCREENING  11/17/2024     DTAP/TDAP/TD IMMUNIZATION (4 - Td or Tdap) 10/14/2026     HEPATITIS C SCREENING  Completed     HIV SCREENING  Completed     INFLUENZA VACCINE  Completed     AORTIC ANEURYSM SCREENING (SYSTEM ASSIGNED)  Completed     COVID-19 Vaccine  Completed     IPV IMMUNIZATION  Aged Out     MENINGITIS IMMUNIZATION  Aged Out     Lab work is in process  Labs reviewed in EPIC  BP Readings from Last 3 Encounters:   10/12/22 126/70   02/25/22 133/77   07/08/21 122/70    Wt Readings from Last 3 Encounters:   10/12/22 79.8 kg (176 lb)   02/25/22 80.2 kg (176 lb 12.8 oz)   07/08/21 82.1 kg (181 lb)                  Patient Active Problem List   Diagnosis     Hyperlipidemia LDL goal <100     TYPE 2 DIABETES, HBA1C GOAL < 7%     Mild major depression (H)     Anemia     Anxiety state     Hypercalciuria     Hypocitraturia     Leukocytosis     Overweight (BMI 25.0-29.9)     Urinary tract stones     Past Surgical History:   Procedure Laterality Date     COMBINED CYSTOSCOPY, INSERT STENT URETER(S) Bilateral 11/3/2015    Procedure: CYSTOSCOPY, BILATERAL URETEROSCOPY, LASER LITHOTRIPSY, STENT INSERTION;  Surgeon: Herman Lucia MD;  Location: [facility];  Service:       FRAGMENTING OF KIDNEY STONE Left      "Description: Lithotripsy;  Recorded: 2012;  Comments: TIMES TWO.     LITHOTRIPSY       SURGICAL HISTORY OF -       kidney stone     URETEROSCOPY       ZZC CYSTOTOMY,EXTRACT &/OR FRAG URETER CALC      Description: Bladder Cystotomy With Basket Extraction Of Calculus;  Recorded: 2012;       Social History     Tobacco Use     Smoking status: Never     Smokeless tobacco: Never   Substance Use Topics     Alcohol use: Yes     Comment: Alcoholic Drinks/day: Less than weekly.     Family History   Problem Relation Age of Onset     Diabetes Mother      C.A.D. Mother         age 42     Glaucoma Mother      Diabetes Father      Cerebrovascular Disease Father          at 93     Hypertension Brother      Breast Cancer No family hx of      Cancer - colorectal No family hx of      Prostate Cancer No family hx of      Macular Degeneration No family hx of      Heart Disease Mother 67.00        Heart attack     Other - See Comments Sister 15.00         at 15, two years post surgery for non-union of a leg fracture that never did heal.     Cerebrovascular Disease Brother         Mild stroke     Hyperlipidemia Brother      Hypertension Brother      Urolithiasis Brother         recurrent     Heart Disease Brother 61.00        \"Heart attack\"     No Known Problems Daughter      Hypertension Brother      Diabetes Brother      No Known Problems Daughter          Current Outpatient Medications   Medication Sig Dispense Refill     aspirin (ASPIRIN LOW DOSE) 81 MG EC tablet Take 1 tablet (81 mg) by mouth daily 90 tablet 3     aspirin 81 MG tablet Take 1 tablet by mouth daily. 100 tablet 3     BLOOD GLUCOSE TEST STRIPS STRP Check 2-4 times per day 2 Strip 12     chlorthalidone (HYGROTON) 25 MG tablet Take 1 tablet (25 mg) by mouth daily 90 tablet 3     metFORMIN (GLUCOPHAGE) 500 MG tablet TAKE 1 TAB BY MOUTH DAILY WITH BREAKFAST AND 1 TAB DAILY WITH SUPPER 180 tablet 1     ONETOUCH ULTRA test strip TEST 1 TO 2 TIMES DAILY 200 " "strip 0     POTASSIUM CITRATE PO Take 1,080 mg by mouth daily       pravastatin (PRAVACHOL) 20 MG tablet TAKE 1 TABLET (20 MG TOTAL) BY MOUTH AT BEDTIME. 90 tablet 3     Allergies   Allergen Reactions     Dilaudid [Hydromorphone] Itching     Recent Labs   Lab Test 02/25/22  0910 07/08/21  0803 07/03/20  1231 06/17/20  0742 09/23/19  0917   A1C 6.7* 7.2*  --  6.4*  --    LDL  --  78 69  --  63   HDL  --  39* 41  --  39*   TRIG  --  138 133  --  110   ALT 34  --   --   --   --    CR 0.79 0.78 0.77  --  0.77   GFRESTIMATED >90 >60 >60  --  >60   GFRESTBLACK  --  >60 >60  --  >60   POTASSIUM 3.7 3.4* 3.9  --  3.6              Review of Systems   Constitutional: Negative for chills and fever.   HENT: Negative for congestion, ear pain, hearing loss and sore throat.    Eyes: Negative for pain and visual disturbance.   Respiratory: Negative for cough and shortness of breath.    Cardiovascular: Negative for chest pain, palpitations and peripheral edema.   Gastrointestinal: Positive for constipation. Negative for abdominal pain, diarrhea, heartburn, hematochezia and nausea.   Genitourinary: Positive for impotence. Negative for dysuria, frequency, genital sores, hematuria, penile discharge and urgency.   Musculoskeletal: Negative for arthralgias, joint swelling and myalgias.   Skin: Negative for rash.   Neurological: Negative for dizziness, weakness, headaches and paresthesias.   Psychiatric/Behavioral: Negative for mood changes. The patient is not nervous/anxious.          OBJECTIVE:   There were no vitals taken for this visit. Estimated body mass index is 27.69 kg/m  as calculated from the following:    Height as of 2/25/22: 1.702 m (5' 7\").    Weight as of 2/25/22: 80.2 kg (176 lb 12.8 oz).  Physical Exam  GENERAL: healthy, alert and no distress  EYES: Eyes grossly normal to inspection, PERRL and conjunctivae and sclerae normal  HENT: ear canals and TM's normal, nose and mouth without ulcers or lesions  NECK: no adenopathy, " no asymmetry, masses, or scars and thyroid normal to palpation  RESP: lungs clear to auscultation - no rales, rhonchi or wheezes  CV: regular rate and rhythm, normal S1 S2, no S3 or S4, no murmur, click or rub, no peripheral edema and peripheral pulses strong  ABDOMEN: soft, nontender, no hepatosplenomegaly, no masses and bowel sounds normal  MS: no gross musculoskeletal defects noted, no edema  SKIN: no suspicious lesions or rashes  NEURO: Normal strength and tone, mentation intact and speech normal  PSYCH: mentation appears normal, affect normal/bright    Diagnostic Test Results:  Labs reviewed in Epic    ASSESSMENT / PLAN:   (Z00.00) Encounter for Medicare annual wellness exam  (primary encounter diagnosis)  Comment: UTD On colonoscopy   Plan: REVIEW OF HEALTH MAINTENANCE PROTOCOL ORDERS                    (E11.9) Type 2 diabetes, HbA1c goal < 7% (H)  Comment: diabetes has been well controlled. Due for labs. He has started making improvements in his diet a couple weeks ago; he will return for lab work in 2 weeks to have a better representation of his A1c. He has decreased metformin to 2 tab/day with stable well controlled glucose, so we will continue this   Plan: Lipid panel reflex to direct LDL Non-fasting,         Albumin Random Urine Quantitative with Creat         Ratio, HEMOGLOBIN A1C      metFORMIN (GLUCOPHAGE) 500 MG tablet   aspirin (ASPIRIN LOW DOSE) 81 MG EC tablet            (E78.00) Pure hypercholesterolemia, unspecified  (Z13.220) Screening for hyperlipidemia  Comment: recheck lipids when fasting soon and continue pravastatin if stable   Plan: Lipid panel reflex to direct LDL Non-fasting  pravastatin (PRAVACHOL) 20 MG tablet          (N20.0) Calculus of kidney  Comment: on chlorthalidone due to recurring kidney stones. Follows annually with urology for this. BP is stable. He is also on daily KCl.   Plan: chlorthalidone (HYGROTON) 25 MG tablet             (Z12.5) Screening for prostate cancer  Plan:  "PSA, screen              Patient has been advised of split billing requirements and indicates understanding: Yes    COUNSELING:  Reviewed preventive health counseling, as reflected in patient instructions       Regular exercise       Healthy diet/nutrition       Vision screening       Aspirin prophylaxis     Estimated body mass index is 27.69 kg/m  as calculated from the following:    Height as of 2/25/22: 1.702 m (5' 7\").    Weight as of 2/25/22: 80.2 kg (176 lb 12.8 oz).    Weight management plan: Discussed healthy diet and exercise guidelines    He reports that he has never smoked. He has never used smokeless tobacco.      Appropriate preventive services were discussed with this patient, including applicable screening as appropriate for cardiovascular disease, diabetes, osteopenia/osteoporosis, and glaucoma.  As appropriate for age/gender, discussed screening for colorectal cancer, prostate cancer, breast cancer, and cervical cancer. Checklist reviewing preventive services available has been given to the patient.    Reviewed patients plan of care and provided an AVS. The Basic Care Plan (routine screening as documented in Health Maintenance) for Kit meets the Care Plan requirement. This Care Plan has been established and reviewed with the Patient.    Counseling Resources:  ATP IV Guidelines  Pooled Cohorts Equation Calculator  Breast Cancer Risk Calculator  Breast Cancer: Medication to Reduce Risk  FRAX Risk Assessment  ICSI Preventive Guidelines  Dietary Guidelines for Americans, 2010  [employer]'s MyPlate  ASA Prophylaxis  Lung CA Screening    Carmen Shabazz DO  [facility]    Identified Health Risks:  "

## 2022-10-26 ENCOUNTER — LAB (OUTPATIENT)
Dept: LAB | Facility: CLINIC | Age: 67
End: 2022-10-26
Payer: COMMERCIAL

## 2022-10-26 DIAGNOSIS — Z12.5 SCREENING FOR PROSTATE CANCER: ICD-10-CM

## 2022-10-26 DIAGNOSIS — Z13.220 SCREENING FOR HYPERLIPIDEMIA: ICD-10-CM

## 2022-10-26 DIAGNOSIS — Z00.00 ENCOUNTER FOR MEDICARE ANNUAL WELLNESS EXAM: ICD-10-CM

## 2022-10-26 DIAGNOSIS — E11.9 TYPE 2 DIABETES, HBA1C GOAL < 7% (H): ICD-10-CM

## 2022-10-26 LAB
ANION GAP SERPL CALCULATED.3IONS-SCNC: 4 MMOL/L (ref 3–14)
BUN SERPL-MCNC: 13 MG/DL (ref 7–30)
CALCIUM SERPL-MCNC: 9.2 MG/DL (ref 8.5–10.1)
CHLORIDE BLD-SCNC: 105 MMOL/L (ref 94–109)
CHOLEST SERPL-MCNC: 128 MG/DL
CO2 SERPL-SCNC: 32 MMOL/L (ref 20–32)
CREAT SERPL-MCNC: 0.73 MG/DL (ref 0.66–1.25)
FASTING STATUS PATIENT QL REPORTED: YES
GFR SERPL CREATININE-BSD FRML MDRD: >90 ML/MIN/1.73M2
GLUCOSE BLD-MCNC: 124 MG/DL (ref 70–99)
HBA1C MFR BLD: 7 % (ref 0–5.6)
HDLC SERPL-MCNC: 40 MG/DL
LDLC SERPL CALC-MCNC: 56 MG/DL
NONHDLC SERPL-MCNC: 88 MG/DL
POTASSIUM BLD-SCNC: 3.4 MMOL/L (ref 3.4–5.3)
PSA SERPL-MCNC: 0.87 UG/L (ref 0–4)
SODIUM SERPL-SCNC: 141 MMOL/L (ref 133–144)
TRIGL SERPL-MCNC: 158 MG/DL

## 2022-10-26 PROCEDURE — 82043 UR ALBUMIN QUANTITATIVE: CPT

## 2022-10-26 PROCEDURE — 83036 HEMOGLOBIN GLYCOSYLATED A1C: CPT | Performed by: STUDENT IN AN ORGANIZED HEALTH CARE EDUCATION/TRAINING PROGRAM

## 2022-10-26 PROCEDURE — 36415 COLL VENOUS BLD VENIPUNCTURE: CPT

## 2022-10-26 PROCEDURE — 80048 BASIC METABOLIC PNL TOTAL CA: CPT

## 2022-10-26 PROCEDURE — 80061 LIPID PANEL: CPT

## 2022-10-26 PROCEDURE — G0103 PSA SCREENING: HCPCS

## 2022-10-27 LAB
CREAT UR-MCNC: 109 MG/DL
MICROALBUMIN UR-MCNC: 15 MG/L
MICROALBUMIN/CREAT UR: 13.76 MG/G CR (ref 0–17)

## 2023-06-02 ENCOUNTER — HEALTH MAINTENANCE LETTER (OUTPATIENT)
Age: 68
End: 2023-06-02

## 2023-06-05 DIAGNOSIS — E11.9 TYPE 2 DIABETES MELLITUS WITHOUT COMPLICATION, WITHOUT LONG-TERM CURRENT USE OF INSULIN (H): ICD-10-CM

## 2023-06-19 ENCOUNTER — TELEPHONE (OUTPATIENT)
Dept: FAMILY MEDICINE | Facility: CLINIC | Age: 68
End: 2023-06-19
Payer: COMMERCIAL

## 2023-06-19 DIAGNOSIS — U07.1 INFECTION DUE TO 2019 NOVEL CORONAVIRUS: Primary | ICD-10-CM

## 2023-06-19 NOTE — TELEPHONE ENCOUNTER
RN COVID TREATMENT VISIT  06/19/23      The patient has been triaged and does not require a higher level of care.    Kit Martin  67 year old  Current weight? 176lbs    Has the patient been seen by a primary care provider at an [facility] or [facility] within the past two years? Yes.   Have you been in close proximity to/do you have a known exposure to a person with a confirmed case of influenza? No.     General treatment eligibility:  Date of positive COVID test (PCR or at home)?  6/19/2023    Are you or have you been hospitalized for this COVID-19 infection? No.   Have you received monoclonal antibodies or antiviral treatment for COVID-19 since this positive test? No.   Do you have any of the following conditions that place you at risk of being very sick from COVID-19?   - Age 50 years or older  - Diabetes mellitus, type 1 and type 2  Yes, patient has at least one high risk condition as noted above.     Current COVID symptoms:   - fever or chills  - cough  - fatigue  - muscle or body aches  - headache  - sore throat  - congestion or runny nose  Yes. Patient has at least one symptom as selected.     How many days since symptoms started? 5 days or less. Established patient, 12 years or older weighing at least 88.2 lbs, who has symptoms that started in the past 5 days, has not been hospitalized nor received treatment already, and is at risk for being very sick from COVID-19.     Treatment eligibility by RN:    Are you currently pregnant or nursing? No    Do you have a clinically significant hypersensitivity to nirmatrelvir or ritonavir, or toxic epidermal necrolysis (TEN) or Hermosillo-Mohsen Syndrome? No    Do you have a history of hepatitis, any hepatic impairment on the Problem List (such as Child-Tucker Class C, cirrhosis, fatty liver disease, alcoholic liver disease), or was the last liver lab (hepatic panel, ALT, AST, ALK Phos, bilirubin) elevated in the past 6 months? No    Do you  have any history of severe renal impairment (eGFR < 30mL/min)? No    Is patient eligible to continue?   Yes, patient meets all eligibility requirements for the RN COVID treatment (as denoted by all no responses above).     Current Outpatient Medications   Medication Sig Dispense Refill     aspirin (ASPIRIN LOW DOSE) 81 MG EC tablet Take 1 tablet (81 mg) by mouth daily 90 tablet 3     aspirin 81 MG tablet Take 1 tablet by mouth daily. 100 tablet 3     BLOOD GLUCOSE TEST STRIPS STRP Check 2-4 times per day 2 Strip 12     chlorthalidone (HYGROTON) 25 MG tablet Take 1 tablet (25 mg) by mouth daily 90 tablet 3     metFORMIN (GLUCOPHAGE) 500 MG tablet TAKE 1 TAB BY MOUTH DAILY WITH BREAKFAST AND 1 TAB DAILY WITH SUPPER ++SCHEDULE FOLLOW UP++ 180 tablet 0     ONETOUCH ULTRA test strip TEST 1 TO 2 TIMES DAILY 200 strip 0     POTASSIUM CITRATE PO Take 1,080 mg by mouth daily       pravastatin (PRAVACHOL) 20 MG tablet TAKE 1 TABLET (20 MG TOTAL) BY MOUTH AT BEDTIME. 90 tablet 3       Medications from List 1 of the standing order (on medications that exclude the use of Paxlovid) that patient is taking: NONE. Is patient taking Coin's Wort? No  Is patient taking Ricardo's Wort or any meds from List 1? No.   Medications from List 2 of the standing order (on meds that provider needs to adjust) that patient is taking: NONE. Is patient on any of the meds from List 2? No.   Medications from List 3 of standing order (on meds that a RN needs to adjust) that patient is taking: NONE. Is patient on any meds from List 3? No.     Paxlovid has an approximate 90% reduction in hospitalization. Paxlovid can possibly cause altered sense of taste, diarrhea (loose, watery stools), high blood pressure, muscle aches.     Would patient like a Paxlovid prescription?   Yes.   Lab Results   Component Value Date    GFRESTIMATED >90 10/26/2022       Was last eGFR reduced? No, eGFR 60 or greater/ No Result on record. Patient can receive the normal  renal function dose. Paxlovid Rx sent to [place] [facility]   [facility]     Temporary change to home medications: None    All medication adjustments (holds, etc) were discussed with the patient and patient was asked to repeat back (teachback) their med adjustment.  Did patient understand med adjustment? No medication adjustments needed.         Reviewed the following instructions with the patient:    Paxlovid (nimatrelvir and ritonavir)    How it works  Two medicines (nirmatrelvir and ritonavir) are taken together. They stop the virus from growing. Less amount of virus is easier for your body to fight.    How to take    Medicine comes in a daily container with both medicine tablets. Take by mouth twice daily (once in the morning, once at night) for 5 days.    The number of tablets to take varies by patient.    Don't chew or break capsules. Swallow whole.    When to take  Take as soon as possible after positive COVID-19 test result, and within 5 days of your first symptoms.    Possible side effects  Can cause altered sense of taste, diarrhea (loose, watery stools), high blood pressure, muscle aches.    Abhijit Tovar RN

## 2023-07-21 DIAGNOSIS — E11.69 TYPE 2 DIABETES MELLITUS WITH OTHER SPECIFIED COMPLICATION, WITHOUT LONG-TERM CURRENT USE OF INSULIN (H): ICD-10-CM

## 2023-10-05 ENCOUNTER — OFFICE VISIT (OUTPATIENT)
Dept: OPHTHALMOLOGY | Facility: CLINIC | Age: 68
End: 2023-10-05
Attending: OPHTHALMOLOGY
Payer: COMMERCIAL

## 2023-10-05 DIAGNOSIS — H52.4 PRESBYOPIA OF BOTH EYES: ICD-10-CM

## 2023-10-05 DIAGNOSIS — E11.9 TYPE 2 DIABETES MELLITUS WITHOUT OPHTHALMIC MANIFESTATIONS (H): Primary | ICD-10-CM

## 2023-10-05 DIAGNOSIS — H02.88B MEIBOMIAN GLAND DYSFUNCTION (MGD) OF UPPER AND LOWER LIDS OF BOTH EYES: ICD-10-CM

## 2023-10-05 DIAGNOSIS — H52.223 REGULAR ASTIGMATISM OF BOTH EYES: ICD-10-CM

## 2023-10-05 DIAGNOSIS — H25.13 NUCLEAR SCLEROTIC CATARACT OF BOTH EYES: ICD-10-CM

## 2023-10-05 DIAGNOSIS — H02.88A MEIBOMIAN GLAND DYSFUNCTION (MGD) OF UPPER AND LOWER LIDS OF BOTH EYES: ICD-10-CM

## 2023-10-05 DIAGNOSIS — H02.831 DERMATOCHALASIS OF BOTH UPPER EYELIDS: ICD-10-CM

## 2023-10-05 DIAGNOSIS — H02.834 DERMATOCHALASIS OF BOTH UPPER EYELIDS: ICD-10-CM

## 2023-10-05 PROCEDURE — 92014 COMPRE OPH EXAM EST PT 1/>: CPT | Performed by: OPHTHALMOLOGY

## 2023-10-05 PROCEDURE — 92015 DETERMINE REFRACTIVE STATE: CPT | Performed by: OPHTHALMOLOGY

## 2023-10-05 ASSESSMENT — REFRACTION_WEARINGRX
OD_CYLINDER: +1.25
OD_CYLINDER: +1.25
OS_CYLINDER: +0.75
OS_ADD: +2.25
OD_CYLINDER: +1.25
SPECS_TYPE: READING ONLY
OD_ADD: +2.25
OD_SPHERE: +0.25
OD_AXIS: 021
OS_AXIS: 152
OS_SPHERE: -0.25
OD_SPHERE: +0.25
OS_AXIS: 152
OS_SPHERE: -0.25
OS_CYLINDER: +0.75
OS_CYLINDER: +0.75
OS_AXIS: 152
SPECS_TYPE: PAL
OD_AXIS: 021
OD_SPHERE: +2.50
OD_AXIS: 021
OS_SPHERE: +2.00

## 2023-10-05 ASSESSMENT — CONF VISUAL FIELD
OD_SUPERIOR_TEMPORAL_RESTRICTION: 0
OD_SUPERIOR_NASAL_RESTRICTION: 0
OS_SUPERIOR_TEMPORAL_RESTRICTION: 0
OS_INFERIOR_NASAL_RESTRICTION: 0
OS_SUPERIOR_NASAL_RESTRICTION: 0
OD_NORMAL: 1
OS_INFERIOR_TEMPORAL_RESTRICTION: 0
OD_INFERIOR_TEMPORAL_RESTRICTION: 0
OS_NORMAL: 1
METHOD: COUNTING FINGERS
OD_INFERIOR_NASAL_RESTRICTION: 0

## 2023-10-05 ASSESSMENT — REFRACTION_MANIFEST
OD_ADD: +2.50
OS_SPHERE: -0.25
OS_ADD: +2.50
OS_AXIS: 150
OD_CYLINDER: +1.50
OD_SPHERE: PLANO
OD_AXIS: 035
OS_CYLINDER: +1.25

## 2023-10-05 ASSESSMENT — VISUAL ACUITY
OS_SC: 20/25
OS_SC+: -3
METHOD: SNELLEN - LINEAR
OD_SC: 20/25
OD_SC+: -2

## 2023-10-05 ASSESSMENT — TONOMETRY
OS_IOP_MMHG: 13
OD_IOP_MMHG: 14
IOP_METHOD: ICARE

## 2023-10-05 ASSESSMENT — CUP TO DISC RATIO
OS_RATIO: 0.25
OD_RATIO: 0.25

## 2023-10-05 ASSESSMENT — EXTERNAL EXAM - RIGHT EYE: OD_EXAM: NORMAL

## 2023-10-05 ASSESSMENT — EXTERNAL EXAM - LEFT EYE: OS_EXAM: NORMAL

## 2023-10-05 NOTE — PROGRESS NOTES
HPI  Kit Martin is a 68 year old male here for diabetic annual eye exam. Diagnosed with Type II Diabetes mellitus around 2014.  Eyes feel comfortable.  HPI     Diabetic Eye Exam     Additional comments: Pt here for diabetic eye exam.           Comments    Pt has fluctuating vision since last exam. He says he is ok at distance but needs readers. BS was 116 before breakfast.  Lab Results       Component                Value               Date                       A1C                      7.0                 10/26/2022                 A1C                      6.7                 02/25/2022                 A1C                      7.2                 07/08/2021                 A1C                      6.4                 06/17/2020                 A1C                      7.0                 09/23/2019                 A1C                      6.2                 05/02/2011                 A1C                      7.1                 05/25/2010            Pinky Eubanks, COA on 10/5/2023 at 1:30 PM            Last edited by Pinky Eubanks COA on 10/5/2023  1:30 PM.        Feels vision is not as clear as it used to be both eyes, just using reading glasses now.      POH: Presbyopia, early cataracts, astigmatism, presbyopia-  mostly over the counter readers , dermatochalasis  GTTs: None  PMH: Type II Diabetes mellitus (mid 50s), last HbA1c 7.0    FH: Mother with glaucoma  SH: Non-smoker    Assessment & Plan       1. Type 2 diabetes mellitus without ophthalmic manifestations (H) - Both Eyes    2. Nuclear sclerotic cataract of both eyes - Both Eyes    3. Meibomian gland dysfunction (MGD) of upper and lower lids of both eyes - Both Eyes    4. Regular astigmatism of both eyes - Both Eyes    5. Presbyopia of both eyes - Both Eyes    6. Dermatochalasis of both upper eyelids - Both Eyes        (E11.9) Type 2 diabetes mellitus without ophthalmic manifestations - Both Eyes  (primary encounter diagnosis)  Comment: No  diabetic retinopathy.  Plan:  Discussed the importance of tight blood glucose, blood pressure, and cholesterol control in the prevention of diabetic retinopathy. Recommend yearly dilated eye exam.      (H25.13) Nuclear sclerotic cataract of both eyes - Both Eyes  Comment: mild not visually significant   Plan: follow    (H02.88A,  H02.88B) Meibomian gland dysfunction (MGD) of upper and lower lids of both eyes - Both Eyes  Comment: asymptomatic   Plan: warm compresses at bedtime as needed for irritation    (H52.223) Regular astigmatism of both eyes - Both Eyes  (H52.4) Presbyopia of both eyes - Both Eyes  Comment: mild changes from current glasses   Plan: manifest refraction done and prescription for glasses given to update as needed     (H02.831,  H02.834) Dermatochalasis of both upper eyelids - Both Eyes  Comment: asymptomatic   Plan: follow       Complete documentation of historical and exam elements from today's encounter can be found in the full encounter summary report (not reduplicated in this progress note). I personally obtained the chief complaint(s) and history of present illness.  I have confirmed and edited as necessary the CC, HPI, PMH/PSH, social history, FMH, ROS, and exam/neuro findings as obtained by the technician or others. I have examined this patient myself and I personally viewed the image(s) and studies listed above and the documentation reflects my findings and interpretation.  I formulated and edited as necessary the assessment and plan and discussed the findings and management plan with the patient and family.     Melanie Mata MD

## 2023-10-05 NOTE — NURSING NOTE
Chief Complaints and History of Present Illnesses   Patient presents with    Diabetic Eye Exam     Pt here for diabetic eye exam.     Chief Complaint(s) and History of Present Illness(es)       Diabetic Eye Exam              Comments: Pt here for diabetic eye exam.              Comments    Pt has fluctuating vision since last exam. He says he is ok at distance but needs readers. BS was 116 before breakfast.  Lab Results       Component                Value               Date                       A1C                      7.0                 10/26/2022                 A1C                      6.7                 02/25/2022                 A1C                      7.2                 07/08/2021                 A1C                      6.4                 06/17/2020                 A1C                      7.0                 09/23/2019                 A1C                      6.2                 05/02/2011                 A1C                      7.1                 05/25/2010            Pinky Eubanks, COA on 10/5/2023 at 1:30 PM

## 2023-10-13 ENCOUNTER — OFFICE VISIT (OUTPATIENT)
Dept: FAMILY MEDICINE | Facility: CLINIC | Age: 68
End: 2023-10-13
Payer: COMMERCIAL

## 2023-10-13 VITALS
DIASTOLIC BLOOD PRESSURE: 69 MMHG | TEMPERATURE: 97.8 F | RESPIRATION RATE: 22 BRPM | SYSTOLIC BLOOD PRESSURE: 133 MMHG | BODY MASS INDEX: 27.31 KG/M2 | WEIGHT: 174 LBS | HEART RATE: 72 BPM | OXYGEN SATURATION: 100 % | HEIGHT: 67 IN

## 2023-10-13 DIAGNOSIS — E78.00 PURE HYPERCHOLESTEROLEMIA, UNSPECIFIED: ICD-10-CM

## 2023-10-13 DIAGNOSIS — E11.9 TYPE 2 DIABETES MELLITUS WITHOUT COMPLICATION, WITHOUT LONG-TERM CURRENT USE OF INSULIN (H): ICD-10-CM

## 2023-10-13 DIAGNOSIS — N20.0 CALCULUS OF KIDNEY: ICD-10-CM

## 2023-10-13 DIAGNOSIS — Z12.5 SCREENING FOR PROSTATE CANCER: ICD-10-CM

## 2023-10-13 DIAGNOSIS — Z00.00 ENCOUNTER FOR MEDICARE ANNUAL WELLNESS EXAM: Primary | ICD-10-CM

## 2023-10-13 LAB
ALBUMIN SERPL BCG-MCNC: 4.6 G/DL (ref 3.5–5.2)
ALP SERPL-CCNC: 48 U/L (ref 40–129)
ALT SERPL W P-5'-P-CCNC: 22 U/L (ref 0–70)
ANION GAP SERPL CALCULATED.3IONS-SCNC: 13 MMOL/L (ref 7–15)
AST SERPL W P-5'-P-CCNC: 23 U/L (ref 0–45)
BILIRUB SERPL-MCNC: 0.2 MG/DL
BUN SERPL-MCNC: 14.8 MG/DL (ref 8–23)
CALCIUM SERPL-MCNC: 9.3 MG/DL (ref 8.8–10.2)
CHLORIDE SERPL-SCNC: 99 MMOL/L (ref 98–107)
CHOLEST SERPL-MCNC: 131 MG/DL
CREAT SERPL-MCNC: 0.74 MG/DL (ref 0.67–1.17)
CREAT UR-MCNC: 87 MG/DL
DEPRECATED HCO3 PLAS-SCNC: 27 MMOL/L (ref 22–29)
EGFRCR SERPLBLD CKD-EPI 2021: >90 ML/MIN/1.73M2
GLUCOSE SERPL-MCNC: 113 MG/DL (ref 70–99)
HBA1C MFR BLD: 6.6 % (ref 0–5.6)
HDLC SERPL-MCNC: 41 MG/DL
LDLC SERPL CALC-MCNC: 73 MG/DL
MICROALBUMIN UR-MCNC: <12 MG/L
MICROALBUMIN/CREAT UR: NORMAL MG/G{CREAT}
NONHDLC SERPL-MCNC: 90 MG/DL
POTASSIUM SERPL-SCNC: 3.6 MMOL/L (ref 3.4–5.3)
PROT SERPL-MCNC: 7.2 G/DL (ref 6.4–8.3)
PSA SERPL DL<=0.01 NG/ML-MCNC: 0.71 NG/ML (ref 0–4.5)
SODIUM SERPL-SCNC: 139 MMOL/L (ref 135–145)
TRIGL SERPL-MCNC: 86 MG/DL

## 2023-10-13 PROCEDURE — 99207 PR FOOT EXAM NO CHARGE: CPT | Performed by: FAMILY MEDICINE

## 2023-10-13 PROCEDURE — 99213 OFFICE O/P EST LOW 20 MIN: CPT | Mod: 25 | Performed by: FAMILY MEDICINE

## 2023-10-13 PROCEDURE — 36415 COLL VENOUS BLD VENIPUNCTURE: CPT | Performed by: FAMILY MEDICINE

## 2023-10-13 PROCEDURE — 90471 IMMUNIZATION ADMIN: CPT | Performed by: FAMILY MEDICINE

## 2023-10-13 PROCEDURE — G0103 PSA SCREENING: HCPCS | Performed by: FAMILY MEDICINE

## 2023-10-13 PROCEDURE — 83036 HEMOGLOBIN GLYCOSYLATED A1C: CPT | Performed by: FAMILY MEDICINE

## 2023-10-13 PROCEDURE — 99397 PER PM REEVAL EST PAT 65+ YR: CPT | Mod: 25 | Performed by: FAMILY MEDICINE

## 2023-10-13 PROCEDURE — 82570 ASSAY OF URINE CREATININE: CPT | Performed by: FAMILY MEDICINE

## 2023-10-13 PROCEDURE — 82043 UR ALBUMIN QUANTITATIVE: CPT | Performed by: FAMILY MEDICINE

## 2023-10-13 PROCEDURE — 80061 LIPID PANEL: CPT | Performed by: FAMILY MEDICINE

## 2023-10-13 PROCEDURE — 90662 IIV NO PRSV INCREASED AG IM: CPT | Performed by: FAMILY MEDICINE

## 2023-10-13 PROCEDURE — 80053 COMPREHEN METABOLIC PANEL: CPT | Performed by: FAMILY MEDICINE

## 2023-10-13 RX ORDER — PRAVASTATIN SODIUM 20 MG
TABLET ORAL
Qty: 90 TABLET | Refills: 3 | Status: SHIPPED | OUTPATIENT
Start: 2023-10-13 | End: 2023-11-14

## 2023-10-13 RX ORDER — BLOOD SUGAR DIAGNOSTIC
STRIP MISCELLANEOUS
Qty: 200 STRIP | Refills: 3 | Status: SHIPPED | OUTPATIENT
Start: 2023-10-13

## 2023-10-13 RX ORDER — CHLORTHALIDONE 25 MG/1
25 TABLET ORAL DAILY
Qty: 90 TABLET | Refills: 3 | Status: SHIPPED | OUTPATIENT
Start: 2023-10-13 | End: 2024-09-30

## 2023-10-13 ASSESSMENT — ENCOUNTER SYMPTOMS
NERVOUS/ANXIOUS: 0
DIZZINESS: 0
JOINT SWELLING: 0
NAUSEA: 0
WEAKNESS: 0
HEMATURIA: 0
ABDOMINAL PAIN: 0
PALPITATIONS: 0
PARESTHESIAS: 0
FREQUENCY: 0
MYALGIAS: 0
DIARRHEA: 0
ENDOCRINE NEGATIVE: 1
EYE PAIN: 0
SORE THROAT: 0
CONSTIPATION: 0
ARTHRALGIAS: 0
FEVER: 0
HEARTBURN: 0
ALLERGIC/IMMUNOLOGIC NEGATIVE: 1
HEMATOCHEZIA: 0
CHILLS: 0
SHORTNESS OF BREATH: 0
HEMATOLOGIC/LYMPHATIC NEGATIVE: 1
COUGH: 0
HEADACHES: 0
DYSURIA: 0

## 2023-10-13 ASSESSMENT — PAIN SCALES - GENERAL: PAINLEVEL: NO PAIN (0)

## 2023-10-13 ASSESSMENT — ACTIVITIES OF DAILY LIVING (ADL): CURRENT_FUNCTION: NO ASSISTANCE NEEDED

## 2023-10-13 NOTE — PROGRESS NOTES
"SUBJECTIVE:   Kit is a 68 year old who presents for Preventive Visit.  Comes for an annual exam, history of diabetes, dyslipidemia        10/13/2023     8:47 AM   Additional Questions   Roomed by Fermin HINES CMA   Accompanied by Self         10/13/2023     8:47 AM   Patient Reported Additional Medications   Patient reports taking the following new medications None       Are you in the first 12 months of your Medicare coverage?  No    Healthy Habits:     In general, how would you rate your overall health?  Good    Frequency of exercise:  6-7 days/week    Duration of exercise:  Greater than 60 minutes    Do you usually eat at least 4 servings of fruit and vegetables a day, include whole grains    & fiber and avoid regularly eating high fat or \"junk\" foods?  Yes    Taking medications regularly:  Yes    Barriers to taking medications:  None    Medication side effects:  None    Ability to successfully perform activities of daily living:  No assistance needed    Home Safety:  No safety concerns identified    Hearing Impairment:  No hearing concerns    In the past 6 months, have you been bothered by leaking of urine?  No    In general, how would you rate your overall mental or emotional health?  Good    Additional concerns today:  No      Today's PHQ-9 Score:       10/13/2023     8:36 AM   PHQ-9 SCORE   PHQ-9 Total Score MyChart 0   PHQ-9 Total Score 0           Have you ever done Advance Care Planning? (For example, a Health Directive, POLST, or a discussion with a medical provider or your loved ones about your wishes): No, advance care planning information given to patient to review.  Patient plans to discuss their wishes with loved ones or provider.         Fall risk  Fallen 2 or more times in the past year?: No  Any fall with injury in the past year?: No    Cognitive Screening   1) Repeat 3 items (Leader, Season, Table)    2) Clock draw: NORMAL  3) 3 item recall: Recalls 3 objects  Results: 3 items recalled: " COGNITIVE IMPAIRMENT LESS LIKELY    Mini-CogTM Copyright TOVA Bautista. Licensed by the author for use in [facility]; reprinted with permission (nayely@.[facility]). All rights reserved.      Do you have sleep apnea, excessive snoring or daytime drowsiness? : no    Reviewed and updated as needed this visit by clinical staff   Tobacco  Allergies  Meds   Med Hx  Surg Hx  Fam Hx  Soc Hx        Reviewed and updated as needed this visit by Provider                 Social History     Tobacco Use    Smoking status: Never     Passive exposure: Never    Smokeless tobacco: Never   Substance Use Topics    Alcohol use: Yes     Comment: Alcoholic Drinks/day: Less than weekly.             10/13/2023     8:38 AM   Alcohol Use   Prescreen: >3 drinks/day or >7 drinks/week? No          No data to display              Do you have a current opioid prescription? No  Do you use any other controlled substances or medications that are not prescribed by a provider? Alcohol      Current providers sharing in care for this patient include:   Patient Care Team:  [facility] as PCP - Melanie Villegas MD as MD (Ophthalmology)  Carmen Shabazz DO as Assigned PCP    The following health maintenance items are reviewed in Epic and correct as of today:  Health Maintenance   Topic Date Due    DEPRESSION ACTION PLAN  Never done    Pneumococcal Vaccine: 65+ Years (1 - PCV) Never done    ZOSTER IMMUNIZATION (1 of 2) Never done    HEPATITIS B IMMUNIZATION (1 of 3 - Risk 3-dose series) Never done    RSV VACCINE 60+ (1 - 1-dose 60+ series) Never done    DIABETIC FOOT EXAM  02/25/2023    A1C  04/26/2023    INFLUENZA VACCINE (1) 09/01/2023    COVID-19 Vaccine (6 - 2023-24 season) 09/01/2023    ANNUAL REVIEW OF HM ORDERS  10/12/2023    MEDICARE ANNUAL WELLNESS VISIT  10/12/2023    BMP  10/26/2023    LIPID  10/26/2023    MICROALBUMIN  10/26/2023    PHQ-9  04/13/2024    EYE EXAM  10/05/2024    FALL  "RISK ASSESSMENT  10/13/2024    COLORECTAL CANCER SCREENING  11/17/2024    DTAP/TDAP/TD IMMUNIZATION (6 - Td or Tdap) 10/14/2026    ADVANCE CARE PLANNING  10/12/2027    HEPATITIS C SCREENING  Completed    AORTIC ANEURYSM SCREENING (SYSTEM ASSIGNED)  Completed    IPV IMMUNIZATION  Aged Out    HPV IMMUNIZATION  Aged Out    MENINGITIS IMMUNIZATION  Aged Out     Lab work is in process      Review of Systems   Constitutional:  Negative for chills and fever.   HENT:  Negative for congestion, ear pain, hearing loss and sore throat.    Eyes:  Negative for pain and visual disturbance.   Respiratory:  Negative for cough and shortness of breath.    Cardiovascular:  Negative for chest pain, palpitations and peripheral edema.   Gastrointestinal:  Negative for abdominal pain, constipation, diarrhea, heartburn, hematochezia and nausea.   Endocrine: Negative.    Genitourinary:  Negative for dysuria, frequency, genital sores, hematuria and urgency.   Musculoskeletal:  Negative for arthralgias, joint swelling and myalgias.   Skin:  Negative for rash.   Allergic/Immunologic: Negative.    Neurological:  Negative for dizziness, weakness, headaches and paresthesias.   Hematological: Negative.    Psychiatric/Behavioral:  Negative for mood changes. The patient is not nervous/anxious.      Constitutional, HEENT, cardiovascular, pulmonary, GI, , musculoskeletal, neuro, skin, endocrine and psych systems are negative, except as otherwise noted.    OBJECTIVE:   /69 (BP Location: Right arm, Patient Position: Chair, Cuff Size: Adult Regular)   Pulse 72   Temp 97.8  F (36.6  C) (Oral)   Resp 22   Ht 1.702 m (5' 7\")   Wt 78.9 kg (174 lb)   SpO2 100%   BMI 27.25 kg/m   Estimated body mass index is 27.25 kg/m  as calculated from the following:    Height as of this encounter: 1.702 m (5' 7\").    Weight as of this encounter: 78.9 kg (174 lb).  Physical Exam  GENERAL: healthy, alert and no distress  EYES: Eyes grossly normal to inspection, " PERRL and conjunctivae and sclerae normal  HENT: ear canals and TM's normal, nose and mouth without ulcers or lesions  NECK: no adenopathy, no asymmetry, masses, or scars and thyroid normal to palpation  RESP: lungs clear to auscultation - no rales, rhonchi or wheezes  CV: regular rate and rhythm, normal S1 S2, no S3 or S4, no murmur, click or rub, no peripheral edema and peripheral pulses strong  ABDOMEN: soft, nontender, no hepatosplenomegaly, no masses and bowel sounds normal  MS: no gross musculoskeletal defects noted, no edema  SKIN: no suspicious lesions or rashes  NEURO: Normal strength and tone, mentation intact and speech normal  PSYCH: mentation appears normal, affect normal/bright  Diabetic foot exam: normal DP and PT pulses, no trophic changes or ulcerative lesions, normal sensory exam, and normal monofilament exam    Diagnostic Test Results:  Labs reviewed in Epic  Orders Placed This Encounter   Procedures    REVIEW OF HEALTH MAINTENANCE PROTOCOL ORDERS    DEPRESSION ACTION PLAN (DAP)    FOOT EXAM    INFLUENZA VACCINE 65+ (FLUZONE HD)    HEMOGLOBIN A1C    Lipid panel reflex to direct LDL Non-fasting    Albumin Random Urine Quantitative with Creat Ratio    Comprehensive metabolic panel (BMP + Alb, Alk Phos, ALT, AST, Total. Bili, TP)    PSA, screen      Lab Results   Component Value Date    A1C 6.6 10/13/2023    A1C 7.0 10/26/2022    A1C 6.7 02/25/2022    A1C 7.2 07/08/2021    A1C 6.4 06/17/2020    A1C 6.2 05/02/2011    A1C 7.1 05/25/2010         ASSESSMENT / PLAN:   (Z00.00) Encounter for Medicare annual wellness exam  (primary encounter diagnosis)  Comment: normal exam  Plan:  Discussed diet, exercise, wellness and other preventive recommendations related to health maintenance.   Follow up as needed for acute issues.    Physical exam recommended in one year.       (E78.00) Pure hypercholesterolemia, unspecified  Comment:   Plan: pravastatin (PRAVACHOL) 20 MG tablet        Lipid panel today    (N20.0)  "Calculus of kidney  Comment:   Plan: chlorthalidone (HYGROTON) 25 MG tablet            (Z12.5) Screening for prostate cancer  Comment:   Plan: PSA, screen            (E11.9) Type 2 diabetes mellitus without complication, without long-term current use of insulin (H)  Comment:   Plan: HEMOGLOBIN A1C, metFORMIN (GLUCOPHAGE) 500 MG         tablet        A1c at 6.6  Continue with Metformin, one tab 2x a day.  Continue with diet modifications and daily exercises.      Patient has been advised of split billing requirements and indicates understanding: Yes      COUNSELING:  Reviewed preventive health counseling, as reflected in patient instructions       Regular exercise       Healthy diet/nutrition       Fall risk prevention       Immunizations  Vaccinated for: Influenza  Declined: Covid-19, Pneumococcal, and Zoster due to Other time.             Prostate cancer screening      BMI:   Estimated body mass index is 27.25 kg/m  as calculated from the following:    Height as of this encounter: 1.702 m (5' 7\").    Weight as of this encounter: 78.9 kg (174 lb).   Weight management plan: Discussed healthy diet and exercise guidelines      He reports that he has never smoked. He has never been exposed to tobacco smoke. He has never used smokeless tobacco.      Appropriate preventive services were discussed with this patient, including applicable screening as appropriate for fall prevention, nutrition, physical activity, Tobacco-use cessation, weight loss and cognition.  Checklist reviewing preventive services available has been given to the patient.    Reviewed patients plan of care and provided an AVS. The Basic Care Plan (routine screening as documented in Health Maintenance) for Kit meets the Care Plan requirement. This Care Plan has been established and reviewed with the Patient.          Leah Hernández MD  [facility]    Identified Health Risks:  I have reviewed Opioid Use Disorder and Substance " Use Disorder risk factors and made any needed referrals.   Answers submitted by the patient for this visit:  Patient Health Questionnaire (Submitted on 10/13/2023)  If you checked off any problems, how difficult have these problems made it for you to do your work, take care of things at home, or get along with other people?: Not difficult at all  PHQ9 TOTAL SCORE: 0

## 2023-10-13 NOTE — PATIENT INSTRUCTIONS
Patient Education   Personalized Prevention Plan  You are due for the preventive services outlined below.  Your care team is available to assist you in scheduling these services.  If you have already completed any of these items, please share that information with your care team to update in your medical record.  Health Maintenance Due   Topic Date Due     Depression Action Plan  Never done     Pneumococcal Vaccine (1 - PCV) Never done     Zoster (Shingles) Vaccine (1 of 2) Never done     Hepatitis B Vaccine (1 of 3 - Risk 3-dose series) Never done     RSV VACCINE 60+ (1 - 1-dose 60+ series) Never done     Diabetic Foot Exam  02/25/2023     A1C Lab  04/26/2023     Flu Vaccine (1) 09/01/2023     COVID-19 Vaccine (6 - 2023-24 season) 09/01/2023     ANNUAL REVIEW OF HM ORDERS  10/12/2023     Annual Wellness Visit  10/12/2023     Basic Metabolic Panel  10/26/2023     Cholesterol Lab  10/26/2023     Kidney Microalbumin Urine Test  10/26/2023

## 2023-10-13 NOTE — NURSING NOTE
Prior to immunization administration, verified patients identity using patient s name and date of birth. Please see Immunization Activity for additional information.     Screening Questionnaire for Adult Immunization    Are you sick today?   No   Do you have allergies to medications, food, a vaccine component or latex?   No   Have you ever had a serious reaction after receiving a vaccination?   No   Do you have a long-term health problem with heart, lung, kidney, or metabolic disease (e.g., diabetes), asthma, a blood disorder, no spleen, complement component deficiency, a cochlear implant, or a spinal fluid leak?  Are you on long-term aspirin therapy?   No   Do you have cancer, leukemia, HIV/AIDS, or any other immune system problem?   No   Do you have a parent, brother, or sister with an immune system problem?   No   In the past 3 months, have you taken medications that affect  your immune system, such as prednisone, other steroids, or anticancer drugs; drugs for the treatment of rheumatoid arthritis, Crohn s disease, or psoriasis; or have you had radiation treatments?   No   Have you had a seizure, or a brain or other nervous system problem?   No   During the past year, have you received a transfusion of blood or blood    products, or been given immune (gamma) globulin or antiviral drug?   No   For women: Are you pregnant or is there a chance you could become       pregnant during the next month?   No   Have you received any vaccinations in the past 4 weeks?   No     Immunization questionnaire answers were all negative.      Patient instructed to remain in clinic for 15 minutes afterwards, and to report any adverse reactions.     Screening performed by Lisa Portillo MA on 10/13/2023 at 9:18 AM.

## 2023-11-14 DIAGNOSIS — E78.00 PURE HYPERCHOLESTEROLEMIA, UNSPECIFIED: ICD-10-CM

## 2023-11-14 RX ORDER — PRAVASTATIN SODIUM 20 MG
TABLET ORAL
Qty: 90 TABLET | Refills: 3 | Status: SHIPPED | OUTPATIENT
Start: 2023-11-14

## 2024-04-20 ENCOUNTER — HEALTH MAINTENANCE LETTER (OUTPATIENT)
Age: 69
End: 2024-04-20

## 2024-09-30 DIAGNOSIS — N20.0 CALCULUS OF KIDNEY: ICD-10-CM

## 2024-09-30 DIAGNOSIS — E11.9 TYPE 2 DIABETES MELLITUS WITHOUT COMPLICATION, WITHOUT LONG-TERM CURRENT USE OF INSULIN (H): ICD-10-CM

## 2024-09-30 RX ORDER — CHLORTHALIDONE 25 MG/1
25 TABLET ORAL DAILY
Qty: 90 TABLET | Refills: 0 | Status: SHIPPED | OUTPATIENT
Start: 2024-09-30

## 2024-10-18 ENCOUNTER — PATIENT OUTREACH (OUTPATIENT)
Dept: CARE COORDINATION | Facility: CLINIC | Age: 69
End: 2024-10-18
Payer: COMMERCIAL

## 2024-10-23 ENCOUNTER — OFFICE VISIT (OUTPATIENT)
Dept: FAMILY MEDICINE | Facility: CLINIC | Age: 69
End: 2024-10-23
Payer: COMMERCIAL

## 2024-10-23 VITALS
HEART RATE: 78 BPM | OXYGEN SATURATION: 99 % | WEIGHT: 174.6 LBS | SYSTOLIC BLOOD PRESSURE: 134 MMHG | BODY MASS INDEX: 26.46 KG/M2 | DIASTOLIC BLOOD PRESSURE: 77 MMHG | RESPIRATION RATE: 19 BRPM | TEMPERATURE: 97.9 F | HEIGHT: 68 IN

## 2024-10-23 DIAGNOSIS — Z12.11 SCREEN FOR COLON CANCER: ICD-10-CM

## 2024-10-23 DIAGNOSIS — E11.9 TYPE 2 DIABETES MELLITUS WITHOUT COMPLICATION, WITHOUT LONG-TERM CURRENT USE OF INSULIN (H): ICD-10-CM

## 2024-10-23 DIAGNOSIS — I10 PRIMARY HYPERTENSION: ICD-10-CM

## 2024-10-23 DIAGNOSIS — Z00.00 ANNUAL PHYSICAL EXAM: Primary | ICD-10-CM

## 2024-10-23 DIAGNOSIS — Z12.5 PROSTATE CANCER SCREENING: ICD-10-CM

## 2024-10-23 DIAGNOSIS — E78.00 PURE HYPERCHOLESTEROLEMIA, UNSPECIFIED: ICD-10-CM

## 2024-10-23 LAB
ALBUMIN SERPL BCG-MCNC: 4.5 G/DL (ref 3.5–5.2)
ALP SERPL-CCNC: 52 U/L (ref 40–150)
ALT SERPL W P-5'-P-CCNC: 30 U/L (ref 0–70)
ANION GAP SERPL CALCULATED.3IONS-SCNC: 15 MMOL/L (ref 7–15)
AST SERPL W P-5'-P-CCNC: 26 U/L (ref 0–45)
BILIRUB SERPL-MCNC: 0.4 MG/DL
BUN SERPL-MCNC: 10.4 MG/DL (ref 8–23)
CALCIUM SERPL-MCNC: 9.8 MG/DL (ref 8.8–10.4)
CHLORIDE SERPL-SCNC: 100 MMOL/L (ref 98–107)
CHOLEST SERPL-MCNC: 137 MG/DL
CREAT SERPL-MCNC: 0.78 MG/DL (ref 0.67–1.17)
CREAT UR-MCNC: 131 MG/DL
EGFRCR SERPLBLD CKD-EPI 2021: >90 ML/MIN/1.73M2
EST. AVERAGE GLUCOSE BLD GHB EST-MCNC: 157 MG/DL
GLUCOSE SERPL-MCNC: 145 MG/DL (ref 70–99)
HBA1C MFR BLD: 7.1 % (ref 0–5.6)
HCO3 SERPL-SCNC: 28 MMOL/L (ref 22–29)
HDLC SERPL-MCNC: 41 MG/DL
HOLD SPECIMEN: NORMAL
LDLC SERPL CALC-MCNC: 70 MG/DL
MICROALBUMIN UR-MCNC: <12 MG/L
MICROALBUMIN/CREAT UR: NORMAL MG/G{CREAT}
NONHDLC SERPL-MCNC: 96 MG/DL
POTASSIUM SERPL-SCNC: 3.2 MMOL/L (ref 3.4–5.3)
PROT SERPL-MCNC: 7.3 G/DL (ref 6.4–8.3)
PSA SERPL DL<=0.01 NG/ML-MCNC: 1.21 NG/ML (ref 0–4.5)
SODIUM SERPL-SCNC: 143 MMOL/L (ref 135–145)
TRIGL SERPL-MCNC: 131 MG/DL

## 2024-10-23 PROCEDURE — 99397 PER PM REEVAL EST PAT 65+ YR: CPT | Mod: 25 | Performed by: FAMILY MEDICINE

## 2024-10-23 PROCEDURE — 82043 UR ALBUMIN QUANTITATIVE: CPT | Performed by: FAMILY MEDICINE

## 2024-10-23 PROCEDURE — 36415 COLL VENOUS BLD VENIPUNCTURE: CPT | Performed by: FAMILY MEDICINE

## 2024-10-23 PROCEDURE — 90480 ADMN SARSCOV2 VAC 1/ONLY CMP: CPT | Performed by: FAMILY MEDICINE

## 2024-10-23 PROCEDURE — 80053 COMPREHEN METABOLIC PANEL: CPT | Performed by: FAMILY MEDICINE

## 2024-10-23 PROCEDURE — 82570 ASSAY OF URINE CREATININE: CPT | Performed by: FAMILY MEDICINE

## 2024-10-23 PROCEDURE — 83036 HEMOGLOBIN GLYCOSYLATED A1C: CPT | Performed by: FAMILY MEDICINE

## 2024-10-23 PROCEDURE — 80061 LIPID PANEL: CPT | Performed by: FAMILY MEDICINE

## 2024-10-23 PROCEDURE — 90662 IIV NO PRSV INCREASED AG IM: CPT | Performed by: FAMILY MEDICINE

## 2024-10-23 PROCEDURE — 90471 IMMUNIZATION ADMIN: CPT | Performed by: FAMILY MEDICINE

## 2024-10-23 PROCEDURE — 99214 OFFICE O/P EST MOD 30 MIN: CPT | Mod: 25 | Performed by: FAMILY MEDICINE

## 2024-10-23 PROCEDURE — G0103 PSA SCREENING: HCPCS | Performed by: FAMILY MEDICINE

## 2024-10-23 PROCEDURE — 91320 SARSCV2 VAC 30MCG TRS-SUC IM: CPT | Performed by: FAMILY MEDICINE

## 2024-10-23 RX ORDER — CHLORTHALIDONE 25 MG/1
25 TABLET ORAL DAILY
Qty: 90 TABLET | Refills: 3 | Status: CANCELLED | OUTPATIENT
Start: 2024-10-23

## 2024-10-23 RX ORDER — LOSARTAN POTASSIUM 50 MG/1
50 TABLET ORAL DAILY
Qty: 90 TABLET | Refills: 3 | Status: SHIPPED | OUTPATIENT
Start: 2024-10-23

## 2024-10-23 RX ORDER — PRAVASTATIN SODIUM 20 MG
TABLET ORAL
Qty: 90 TABLET | Refills: 3 | Status: SHIPPED | OUTPATIENT
Start: 2024-10-23

## 2024-10-23 SDOH — HEALTH STABILITY: PHYSICAL HEALTH: ON AVERAGE, HOW MANY DAYS PER WEEK DO YOU ENGAGE IN MODERATE TO STRENUOUS EXERCISE (LIKE A BRISK WALK)?: 5 DAYS

## 2024-10-23 ASSESSMENT — PATIENT HEALTH QUESTIONNAIRE - PHQ9
SUM OF ALL RESPONSES TO PHQ QUESTIONS 1-9: 3
10. IF YOU CHECKED OFF ANY PROBLEMS, HOW DIFFICULT HAVE THESE PROBLEMS MADE IT FOR YOU TO DO YOUR WORK, TAKE CARE OF THINGS AT HOME, OR GET ALONG WITH OTHER PEOPLE: NOT DIFFICULT AT ALL
SUM OF ALL RESPONSES TO PHQ QUESTIONS 1-9: 3

## 2024-10-23 ASSESSMENT — SOCIAL DETERMINANTS OF HEALTH (SDOH): HOW OFTEN DO YOU GET TOGETHER WITH FRIENDS OR RELATIVES?: ONCE A WEEK

## 2024-10-23 NOTE — PROGRESS NOTES
"Preventive Care Visit  [facility]  Leah Hernández MD, Family Medicine  Oct 23, 2024      Assessment & Plan     Annual physical exam     Discussed diet, exercise, wellness and other preventive recommendations related to health maintenance.   Follow up as needed for acute issues.    Physical exam recommended in one year.       Type 2 diabetes mellitus without complication, without long-term current use of insulin (H)  A1c at 7.1  Patient physically very active.  Advised with diet, exercise, diet modification.  His metformin take 2 tablets in the morning, 1 tablet in the evening.  Follow-up in 6  - OPTOMETRY REFERRAL; Future  - Comprehensive metabolic panel (BMP + Alb, Alk Phos, ALT, AST, Total. Bili, TP); Future  - metFORMIN (GLUCOPHAGE) 500 MG tablet; 2 TABLET in AM and one tab PM, after a meal  - FOOT EXAM  - Comprehensive metabolic panel (BMP + Alb, Alk Phos, ALT, AST, Total. Bili, TP)    Pure hypercholesterolemia, unspecified    - Lipid panel reflex to direct LDL Non-fasting; Future  - pravastatin (PRAVACHOL) 20 MG tablet; TAKE 1 TABLET BY MOUTH EVERYDAY AT BEDTIME  - Lipid panel reflex to direct LDL Non-fasting    Primary hypertension  Discontinued Chlorthalidone, ( 2nd to dry mouth )  - losartan (COZAAR) 50 MG tablet; Take 1 tablet (50 mg) by mouth daily.    Prostate cancer screening  Screening,  - PSA, screen; Future  - PSA, screen    Screen for colon cancer  Scheduled for colonoscopy on 11/19/2024 thru [facility]    Patient has been advised of split billing requirements and indicates understanding: Yes        BMI  Estimated body mass index is 26.93 kg/m  as calculated from the following:    Height as of this encounter: 1.715 m (5' 7.52\").    Weight as of this encounter: 79.2 kg (174 lb 9.6 oz).   Weight management plan: Discussed healthy diet and exercise guidelines    Counseling  Appropriate preventive services were addressed with this patient via screening, questionnaire, or " discussion as appropriate for fall prevention, nutrition, physical activity, Tobacco-use cessation, social engagement, weight loss and cognition.  Checklist reviewing preventive services available has been given to the patient.  Reviewed patient's diet, addressing concerns and/or questions.   The patient was instructed to see the dentist every 6 months.       Work on weight loss  Regular exercise    Cecile Pantoja is a 69 year old, presenting for the following:  Physical        10/23/2024     7:41 AM   Additional Questions   Roomed by adam watt   Accompanied by self         10/23/2024     7:41 AM   Patient Reported Additional Medications   Patient reports taking the following new medications none        Via the Health Maintenance questionnaire, the patient has reported the following services have been completed -Eye Exam:  [facility] 2023-11-13, this information has not been sent to the abstraction team.      Dark spot on left eyebrow for a long time ago. Per patient he remembers getting a scratch there.    Left shoulder pain when he sleeps laying on that side ongoing for a few weeks. Going a few shoulder exercise that is effective so it is not painful anymore.    Diabetes Follow-up    How often are you checking your blood sugar? One time daily  What time of day are you checking your blood sugars (select all that apply)?   AM  Have you had any blood sugars above 200?  Yes after meals  Have you had any blood sugars below 70?  No  What symptoms do you notice when your blood sugar is low?  None  What concerns do you have today about your diabetes? Other: higher numbers   Do you have any of these symptoms? (Select all that apply)  No numbness or tingling in feet.  No redness, sores or blisters on feet.  No complaints of excessive thirst.  No reports of blurry vision.  No significant changes to weight. Patient does have dry mouth even when he drinks water.      BP Readings from Last 2 Encounters:   10/23/24  134/77   10/13/23 133/69     Hemoglobin A1C (%)   Date Value   10/23/2024 7.1 (H)   10/13/2023 6.6 (H)   05/02/2011 6.2 (H)   05/25/2010 7.1 (H)     LDL Cholesterol Calculated (mg/dL)   Date Value   10/13/2023 73   10/26/2022 56   05/02/2011 132 (H)   05/25/2010 122             Hyperlipidemia Follow-Up    Are you regularly taking any medication or supplement to lower your cholesterol?   Yes- statin  Are you having muscle aches or other side effects that you think could be caused by your cholesterol lowering medication?  No      Health Care Directive  Patient does not have a Health Care Directive: Discussed advance care planning with patient; however, patient declined at this time.      10/23/2024   General Health   How would you rate your overall physical health? Good   Feel stress (tense, anxious, or unable to sleep) Not at all            10/23/2024   Nutrition   Diet: Vegetarian/vegan            10/23/2024   Exercise   Days per week of moderate/strenous exercise 5 days            10/23/2024   Social Factors   Frequency of gathering with friends or relatives Once a week   Worry food won't last until get money to buy more No   Food not last or not have enough money for food? No   Do you have housing? (Housing is defined as stable permanent housing and does not include staying ouside in a car, in a tent, in an abandoned building, in an overnight shelter, or couch-surfing.) Yes   Are you worried about losing your housing? No   Lack of transportation? No   Unable to get utilities (heat,electricity)? No            10/23/2024   Fall Risk   Fallen 2 or more times in the past year? No     No    Trouble with walking or balance? No     No        Patient-reported    Multiple values from one day are sorted in reverse-chronological order          10/23/2024   Activities of Daily Living- Home Safety   Needs help with the following daily activites None of the above   Safety concerns in the home None of the above             10/23/2024   Dental   Dentist two times every year? (!) NO            10/23/2024   Hearing Screening   Hearing concerns? None of the above            10/23/2024   Driving Risk Screening   Patient/family members have concerns about driving No            10/23/2024   General Alertness/Fatigue Screening   Have you been more tired than usual lately? No            10/23/2024   Urinary Incontinence Screening   Bothered by leaking urine in past 6 months No            10/23/2024   TB Screening   Were you born outside of the [place]? Yes          Today's PHQ-9 Score:       10/23/2024     7:27 AM   PHQ-9 SCORE   PHQ-9 Total Score MyChart 3 (Minimal depression)   PHQ-9 Total Score 3        Patient-reported         10/23/2024   Substance Use   Alcohol more than 3/day or more than 7/wk No   Do you have a current opioid prescription? No   How severe/bad is pain from 1 to 10? 0/10 (No Pain)   Do you use any other substances recreationally? No        Social History     Tobacco Use    Smoking status: Never     Passive exposure: Never    Smokeless tobacco: Never   Vaping Use    Vaping status: Never Used   Substance Use Topics    Alcohol use: Yes     Comment: Alcoholic Drinks/day: Less than weekly.    Drug use: No           10/23/2024   AAA Screening   Family history of Abdominal Aortic Aneurysm (AAA)? Unsure      Last PSA:   PSA   Date Value Ref Range Status   05/25/2010 0.80 0 - 4 ug/L Final     Prostate Specific Antigen Screen   Date Value Ref Range Status   10/13/2023 0.71 0.00 - 4.50 ng/mL Final   10/26/2022 0.87 0.00 - 4.00 ug/L Final     ASCVD Risk   The 10-year ASCVD risk score (Nahid SKAGGS, et al., 2019) is: 24.6%    Values used to calculate the score:      Age: 69 years      Sex: Male      Is Non- : No      Diabetic: Yes      Tobacco smoker: No      Systolic Blood Pressure: 134 mmHg      Is BP treated: No      HDL Cholesterol: 41 mg/dL      Total Cholesterol: 131 mg/dL    Fracture Risk Assessment  Tool  Link to Frax Calculator  Use the information below to complete the Frax calculator  : 1955  Sex: male  Weight (kg): 79.2 kg (actual weight)  Height (cm): 171.5 cm  Previous Fragility Fracture:  No  History of parent with fractured hip:  No  Current Smoking:  No  Patient has been on glucocorticoids for more than 3 months (5mg/day or more): No  Rheumatoid Arthritis on Problem List:  No  Secondary Osteoporosis on Problem List:  No  Consumes 3 or more units of alcohol per day: No  Femoral Neck BMD (g/cm2)            Reviewed and updated as needed this visit by Provider                    Past Medical History:   Diagnosis Date    Anxiety     Anxiety     Depression     Depression     Diabetes (H)     GERD (gastroesophageal reflux disease)     GERD (gastroesophageal reflux disease)     Hypercholesterolemia     Hypercholesterolemia     Hypertension     Hypertension     Kidney stone     Kidney stone     LEFT SHOULDER IMPINGMENT 2008    Overweight     Overweight     PONV (postoperative nausea and vomiting)     PONV (postoperative nausea and vomiting)     Type 2 diabetes mellitus (H)     Type 2 diabetes mellitus (H)      Past Surgical History:   Procedure Laterality Date    COMBINED CYSTOSCOPY, INSERT STENT URETER(S) Bilateral 11/3/2015    Procedure: CYSTOSCOPY, BILATERAL URETEROSCOPY, LASER LITHOTRIPSY, STENT INSERTION;  Surgeon: Herman Lucia MD;  Location: [facility];  Service:     HC FRAGMENTING OF KIDNEY STONE Left     Description: Lithotripsy;  Recorded: 2012;  Comments: TIMES TWO.    LITHOTRIPSY      SURGICAL HISTORY OF -       kidney stone    URETEROSCOPY      ZZC CYSTOTOMY,EXTRACT &/OR FRAG URETER CALC      Description: Bladder Cystotomy With Basket Extraction Of Calculus;  Recorded: 2012;     OB History   No obstetric history on file.     Current providers sharing in care for this patient include:  Patient Care Team:  [facility] as PCP -  "General  Melanie Mata MD as MD (Ophthalmology)  Melanie Mata MD as Assigned Surgical Provider  Leah Hernández MD as Assigned PCP    The following health maintenance items are reviewed in Epic and correct as of today:  Health Maintenance   Topic Date Due    Pneumococcal Vaccine: 65+ Years (1 of 2 - PCV) Never done    ZOSTER IMMUNIZATION (1 of 2) Never done    RSV VACCINE (1 - Risk 60-74 years 1-dose series) Never done    EYE EXAM  10/05/2024    BMP  10/13/2024    LIPID  10/13/2024    MICROALBUMIN  10/13/2024    DIABETIC FOOT EXAM  10/13/2024    ANNUAL REVIEW OF HM ORDERS  10/13/2024    MEDICARE ANNUAL WELLNESS VISIT  10/13/2024    COLORECTAL CANCER SCREENING  11/17/2024    A1C  04/23/2025    PHQ-9  04/23/2025    FALL RISK ASSESSMENT  10/23/2025    DTAP/TDAP/TD IMMUNIZATION (6 - Td or Tdap) 10/14/2026    ADVANCE CARE PLANNING  10/23/2029    HEPATITIS C SCREENING  Completed    DEPRESSION ACTION PLAN  Completed    INFLUENZA VACCINE  Completed    COVID-19 Vaccine  Completed    HPV IMMUNIZATION  Aged Out    MENINGITIS IMMUNIZATION  Aged Out    RSV MONOCLONAL ANTIBODY  Aged Out         Review of Systems  Constitutional, HEENT, cardiovascular, pulmonary, GI, , musculoskeletal, neuro, skin, endocrine and psych systems are negative, except as otherwise noted.     Objective    Exam  /77 (BP Location: Right arm, Patient Position: Chair, Cuff Size: Adult Regular)   Pulse 78   Temp 97.9  F (36.6  C) (Oral)   Resp 19   Ht 1.715 m (5' 7.52\")   Wt 79.2 kg (174 lb 9.6 oz)   SpO2 99%   BMI 26.93 kg/m     Estimated body mass index is 26.93 kg/m  as calculated from the following:    Height as of this encounter: 1.715 m (5' 7.52\").    Weight as of this encounter: 79.2 kg (174 lb 9.6 oz).    Physical Exam  GENERAL: alert and no distress  EYES: Eyes grossly normal to inspection, PERRL and conjunctivae and sclerae normal  HENT: ear canals and TM's normal, nose and mouth without ulcers or " lesions  NECK: no adenopathy, no asymmetry, masses, or scars  RESP: lungs clear to auscultation - no rales, rhonchi or wheezes  CV: regular rate and rhythm, normal S1 S2, no S3 or S4, no murmur, click or rub, no peripheral edema  ABDOMEN: soft, nontender, no hepatosplenomegaly, no masses and bowel sounds normal  MS: no gross musculoskeletal defects noted, no edema  SKIN: no suspicious lesions or rashes  NEURO: Normal strength and tone, mentation intact and speech normal  PSYCH: mentation appears normal, affect normal/bright  Diabetic foot exam: normal DP and PT pulses, no trophic changes or ulcerative lesions, normal sensory exam, and normal monofilament exam     Orders Placed This Encounter   Procedures    REVIEW OF HEALTH MAINTENANCE PROTOCOL ORDERS    FOOT EXAM    INFLUENZA HIGH DOSE, TRIVALENT, PF (FLUZONE)    COVID-19 12+ (PFIZER)    PSA, screen    HEMOGLOBIN A1C    Lipid panel reflex to direct LDL Non-fasting    Albumin Random Urine Quantitative with Creat Ratio    Extra Tube    Extra Red Top Tube    Extra Green Top (Lithium Heparin) Tube    Extra Purple Top Tube    Comprehensive metabolic panel (BMP + Alb, Alk Phos, ALT, AST, Total. Bili, TP)    OPTOMETRY REFERRAL      Lab Results   Component Value Date    A1C 7.1 10/23/2024    A1C 6.6 10/13/2023    A1C 7.0 10/26/2022    A1C 6.7 02/25/2022    A1C 7.2 07/08/2021    A1C 6.2 05/02/2011    A1C 7.1 05/25/2010           10/23/2024   Mini Cog   Clock Draw Score 2 Normal   3 Item Recall 3 objects recalled   Mini Cog Total Score 5                Signed Electronically by: Leah Hernández MD    Answers submitted by the patient for this visit:  Patient Health Questionnaire (Submitted on 10/23/2024)  If you checked off any problems, how difficult have these problems made it for you to do your work, take care of things at home, or get along with other people?: Not difficult at all  PHQ9 TOTAL SCORE: 3

## 2024-10-28 ENCOUNTER — TELEPHONE (OUTPATIENT)
Dept: GASTROENTEROLOGY | Facility: CLINIC | Age: 69
End: 2024-10-28
Payer: COMMERCIAL

## 2024-10-28 ENCOUNTER — PATIENT OUTREACH (OUTPATIENT)
Dept: GASTROENTEROLOGY | Facility: CLINIC | Age: 69
End: 2024-10-28
Payer: COMMERCIAL

## 2024-10-28 DIAGNOSIS — Z12.11 SPECIAL SCREENING FOR MALIGNANT NEOPLASMS, COLON: Primary | ICD-10-CM

## 2024-10-28 NOTE — TELEPHONE ENCOUNTER
Patient request to schedule a colonoscopy through [facility]. Currently scheduled with [facility] on 11/19/24, but the patient is cancelling that.     Staff message to [facility] nurses requesting screening colonoscopy order.

## 2024-10-28 NOTE — PROGRESS NOTES
"Pt requesting order for colonoscopy at [facility]. He is cancelling his [facility] appointment per scheduling.    [facility] Screening Colonoscopy Referral Review    Patient meets the inclusion criteria for screening colonoscopy standing order.    Ordering/Referring Provider:  Dr. Hernández      BMI: Estimated body mass index is 26.93 kg/m  as calculated from the following:    Height as of 10/23/24: 1.715 m (5' 7.52\").    Weight as of 10/23/24: 79.2 kg (174 lb 9.6 oz).     Sedation:  Does patient have any of the following conditions affecting sedation?  No medical conditions affecting sedation.    Previous Scopes:  Any previous recommendations or follow up needs based on previous scope?  na / No recommendations.    Medical Concerns to Postpone Order:  Does patient have any of the following medical concerns that should postpone/delay colonoscopy referral?  No medical conditions affecting colonoscopy referral.    Final Referral Details:  Based on patient's medical history patient is appropriate for referral order with moderate sedation. If patient's BMI > 50 do not schedule in [facility].  "

## 2024-11-04 ENCOUNTER — HOSPITAL ENCOUNTER (OUTPATIENT)
Facility: AMBULATORY SURGERY CENTER | Age: 69
End: 2024-11-04
Attending: FAMILY MEDICINE
Payer: COMMERCIAL

## 2024-11-04 ENCOUNTER — TELEPHONE (OUTPATIENT)
Dept: GASTROENTEROLOGY | Facility: CLINIC | Age: 69
End: 2024-11-04
Payer: COMMERCIAL

## 2024-11-04 NOTE — TELEPHONE ENCOUNTER
"Endoscopy Scheduling Screen    Have you had any respiratory illness or flu-like symptoms in the last 10 days?  No      What is your communication preference for Instructions and/or Bowel Prep?   [employer]  (some trouble accessing, but can get into on his phone; offered [employer] helpline #, he declined)      What insurance is in the chart?  Other:  bp    Ordering/Referring Provider: LUC MENDEZ   (If ordering provider performs procedure, schedule with ordering provider unless otherwise instructed. )    BMI: Estimated body mass index is 26.93 kg/m  as calculated from the following:    Height as of 10/23/24: 1.715 m (5' 7.52\").    Weight as of 10/23/24: 79.2 kg (174 lb 9.6 oz).       Sedation Ordered  moderate sedation.   If patient BMI > 50 do not schedule in [facility].    If patient BMI > 45 do not schedule at [facility].    Are you taking methadone or Suboxone?  NO, No RN review required.    Have you been diagnosed and are being treated for severe PTSD or severe anxiety?  NO, No RN review required.    Are you taking any prescription medications for pain 3 or more times per week?   NO, No RN review required.      Do you have a history of malignant hyperthermia?  No      (Females) Are you currently pregnant?        Have you been diagnosed or told you have pulmonary hypertension?   No      Do you have an LVAD?  No    Have you been told you have moderate to severe sleep apnea?  No.      Have you been told you have COPD, asthma, or any other lung disease?  No      Do you have any heart conditions?  No       Have you ever had or are you waiting for an organ transplant?  No. Continue scheduling, no site restrictions.      Have you had a stroke or transient ischemic attack (TIA aka \"mini stroke\" in the last 6 months?   No      Have you been diagnosed with or been told you have cirrhosis of the liver?   No.      Are you currently on dialysis?   No      Do you need assistance transferring?   No    BMI: Estimated body mass index is 26.93 " "kg/m  as calculated from the following:    Height as of 10/23/24: 1.715 m (5' 7.52\").    Weight as of 10/23/24: 79.2 kg (174 lb 9.6 oz).     Is patients BMI > 40 and scheduling location [facility]?  No      Do you take an injectable or oral medication for weight loss or diabetes (excluding insulin)?  Yes, hold time can be up to 7 days. Please consult with you prescribing provider to discuss endoscopy recommendations. (Please schedule at least 7 days out.)  METFORMIN    Do you take the medication Naltrexone?  No      Do you take blood thinners?  No    81 MG ASPIRIN    Prep   Are you currently on dialysis or do you have chronic kidney disease?  No      Do you have a diagnosis of diabetes?  A1C 7.1      Do you have a diagnosis of cystic fibrosis (CF)?  No    On a regular basis do you go 3 -5 days between bowel movements?  No      BMI > 40?  No    Preferred Pharmacy:    [facility] [id number] [place] [address] [address]  Phone: [phone] Fax: [phone]      Final Scheduling Details     Procedure scheduled  Colonoscopy    Surgeon:  DAI     Date of procedure:  12/12/24     Pre-OP / PAC:   No - Not required for this site.    Location  [facility] - Patient preference.    Sedation   MAC/Deep Sedation  PER SITE      Patient Reminders:   You will receive a call from a Nurse to review instructions and health history.  This assessment must be completed prior to your procedure.  Failure to complete the Nurse assessment may result in the procedure being cancelled.      On the day of your procedure, please designate an adult(s) who can drive you home stay with you for the next 24 hours. The medicines used in the exam will make you sleepy. You will not be able to drive.      You cannot take public transportation, ride share services, or non-medical taxi service without a responsible caregiver.  Medical transport services are allowed with the requirement that a responsible caregiver " will receive you at your destination.  We require that drivers and caregivers are confirmed prior to your procedure.

## 2024-11-18 ENCOUNTER — OFFICE VISIT (OUTPATIENT)
Dept: OPHTHALMOLOGY | Facility: CLINIC | Age: 69
End: 2024-11-18
Attending: FAMILY MEDICINE
Payer: COMMERCIAL

## 2024-11-18 DIAGNOSIS — H35.722 CENTRAL SEROUS RETINOPATHY OF LEFT EYE WITH SMALL RETINAL PIGMENT EPITHELIAL DETACHMENT: ICD-10-CM

## 2024-11-18 DIAGNOSIS — E11.9 TYPE 2 DIABETES MELLITUS WITHOUT COMPLICATION, WITHOUT LONG-TERM CURRENT USE OF INSULIN (H): ICD-10-CM

## 2024-11-18 DIAGNOSIS — H35.712 CENTRAL SEROUS RETINOPATHY OF LEFT EYE WITH SMALL RETINAL PIGMENT EPITHELIAL DETACHMENT: ICD-10-CM

## 2024-11-18 DIAGNOSIS — H25.13 NUCLEAR SCLEROTIC CATARACT OF BOTH EYES: ICD-10-CM

## 2024-11-18 DIAGNOSIS — H52.4 PRESBYOPIA OF BOTH EYES: ICD-10-CM

## 2024-11-18 DIAGNOSIS — E11.9 TYPE 2 DIABETES MELLITUS WITHOUT OPHTHALMIC MANIFESTATIONS (H): Primary | ICD-10-CM

## 2024-11-18 DIAGNOSIS — H52.203 MYOPIC ASTIGMATISM OF BOTH EYES: ICD-10-CM

## 2024-11-18 DIAGNOSIS — H52.13 MYOPIC ASTIGMATISM OF BOTH EYES: ICD-10-CM

## 2024-11-18 ASSESSMENT — REFRACTION_WEARINGRX
OD_SPHERE: +0.25
OS_SPHERE: -0.25
OD_AXIS: 021
OD_CYLINDER: +1.25
OD_ADD: +2.25
SPECS_TYPE: READING ONLY
OS_CYLINDER: +0.75
OS_AXIS: 152
OS_ADD: +2.25
OS_SPHERE: -0.25
OD_CYLINDER: +1.25
OD_AXIS: 021
OS_AXIS: 152
OS_AXIS: 152
OD_SPHERE: +0.25
OD_AXIS: 021
OD_CYLINDER: +1.25
OD_SPHERE: +2.50
OS_SPHERE: +2.00
SPECS_TYPE: PAL

## 2024-11-18 ASSESSMENT — REFRACTION_MANIFEST
OS_SPHERE: -0.25
OD_AXIS: 015
OD_ADD: +2.50
OD_SPHERE: -0.75
OD_CYLINDER: +1.25
OS_AXIS: 170
OS_CYLINDER: +1.50
OS_ADD: +2.50

## 2024-11-18 ASSESSMENT — CONF VISUAL FIELD
OD_SUPERIOR_NASAL_RESTRICTION: 0
METHOD: COUNTING FINGERS
OD_INFERIOR_TEMPORAL_RESTRICTION: 0
OS_SUPERIOR_TEMPORAL_RESTRICTION: 0
OD_NORMAL: 1
OD_INFERIOR_NASAL_RESTRICTION: 0
OS_NORMAL: 1
OS_INFERIOR_NASAL_RESTRICTION: 0
OS_INFERIOR_TEMPORAL_RESTRICTION: 0
OD_SUPERIOR_TEMPORAL_RESTRICTION: 0
OS_SUPERIOR_NASAL_RESTRICTION: 0

## 2024-11-18 ASSESSMENT — VISUAL ACUITY
OD_SC+: -2
OS_SC+: -1+2
OS_SC: 20/40
OD_SC: 20/30
METHOD: SNELLEN - LINEAR
OS_PH_SC: 20/20
OS_PH_SC+: -2

## 2024-11-18 ASSESSMENT — EXTERNAL EXAM - RIGHT EYE: OD_EXAM: NORMAL

## 2024-11-18 ASSESSMENT — TONOMETRY
OS_IOP_MMHG: 17
OD_IOP_MMHG: 21
IOP_METHOD: TONOPEN

## 2024-11-18 ASSESSMENT — EXTERNAL EXAM - LEFT EYE: OS_EXAM: NORMAL

## 2024-11-18 ASSESSMENT — CUP TO DISC RATIO
OD_RATIO: 0.25
OS_RATIO: 0.25

## 2024-11-18 NOTE — PROGRESS NOTES
"HPI  Kit Martin is a 69 year old male here for annual eye exam for diabetes mellitus.  Eyes feel comfortable. Feels vision is not as clear as it used to be both eyes.   HPI       Diabetic Eye Exam    Associated symptoms include Negative for blurred vision, tearing, flashes and floaters.  Diabetes characteristics include Type 2.  Blood sugar level is controlled.  Treatments tried include no treatments.  Pain was noted as 0/10.             Comments    Pt denies blurred VA but notes he is having some eye strain, he notes it is mostly at near, he writes a lot per pt.   Last reading this morning 144 usually 120, didn't take meds yesterday \"completely as I was traveling from [place]\"  Lab Results       Component                Value               Date                       A1C                      7.1                 10/23/2024                 A1C                      6.6                 10/13/2023                 A1C                      7.0                 10/26/2022                 A1C                      6.7                 02/25/2022                 A1C                      7.2                 07/08/2021                 A1C                      6.2                 05/02/2011                 A1C                      7.1                 05/25/2010              Veronica Forman COT November 18, 2024 10:48 AM              Last edited by Rema Forman on 11/18/2024 10:49 AM.            POH: Presbyopia, early cataracts, astigmatism, presbyopia-  mostly over the counter readers , dermatochalasis  Eye Meds: None  PMH: Type II Diabetes mellitus (diagnosis in his mid 50s), last HbA1c 7.1    FH: Mother with glaucoma  SH: Non-smoker    Assessment & Plan       1. Type 2 diabetes mellitus without ophthalmic manifestations (H) - Both Eyes    2. Type 2 diabetes mellitus without complication, without long-term current use of insulin (H)    3. Nuclear sclerotic cataract of both eyes - Both Eyes    4. Myopic astigmatism of both eyes - " Both Eyes    5. Presbyopia of both eyes - Both Eyes    6. 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  Plan:  Discussed the importance of tight blood glucose, blood pressure, and cholesterol control in the prevention of diabetic retinopathy. Recommend yearly dilated eye exam.      (H25.13) Nuclear sclerotic cataract of both eyes - Both Eyes  Comment: mild not visually significant   Plan: follow    Myopic astigmatism of both eyes - Both Eyes  (H52.4) Presbyopia of both eyes - Both Eyes  Comment: mild changes from current glasses , good vision with correction  Plan: manifest refraction done and prescription for glasses given to update as needed     Central serous retinopathy of left eye  Comment: asymptomatic , probable history of  right eye, pigment epithelial detachment noted in the left eye not central  No steroid use currently  Plan: follow, call with vision changes    Return in about 1 year (around 11/18/2025) for Comprehensive Exam.       Complete documentation of historical and exam elements from today's encounter can be found in the full encounter summary report (not reduplicated in this progress note). I personally obtained the chief complaint(s) and history of present illness.  I have confirmed and edited as necessary the CC, HPI, PMH/PSH, social history, FMH, ROS, and exam/neuro findings as obtained by the technician or others. I have examined this patient myself and I personally viewed the image(s) and studies listed above and the documentation reflects my findings and interpretation.  I formulated and edited as necessary the assessment and plan and discussed the findings and management plan with the patient and family.     Melanie Mata MD

## 2024-11-18 NOTE — NURSING NOTE
"Chief Complaints and History of Present Illnesses   Patient presents with    Diabetic Eye Exam     Chief Complaint(s) and History of Present Illness(es)       Diabetic Eye Exam              Associated symptoms: Negative for blurred vision, tearing, flashes and floaters    Diabetes Type: Type 2    Blood Sugars: is controlled    Treatments tried: no treatments    Pain scale: 0/10              Comments    Pt denies blurred VA but notes he is having some eye strain, he notes it is mostly at near, he writes a lot per pt.   Last reading this morning 144 usually 120, didn't take meds yesterday \"completely as I was traveling from [place]\"  Lab Results       Component                Value               Date                       A1C                      7.1                 10/23/2024                 A1C                      6.6                 10/13/2023                 A1C                      7.0                 10/26/2022                 A1C                      6.7                 02/25/2022                 A1C                      7.2                 07/08/2021                 A1C                      6.2                 05/02/2011                 A1C                      7.1                 05/25/2010              Veronica Forman COT November 18, 2024 10:48 AM                       "

## 2025-03-24 ENCOUNTER — PATIENT OUTREACH (OUTPATIENT)
Dept: CARE COORDINATION | Facility: CLINIC | Age: 70
End: 2025-03-24
Payer: COMMERCIAL

## 2025-04-20 ENCOUNTER — HEALTH MAINTENANCE LETTER (OUTPATIENT)
Age: 70
End: 2025-04-20

## 2025-05-11 ENCOUNTER — HEALTH MAINTENANCE LETTER (OUTPATIENT)
Age: 70
End: 2025-05-11

## 2025-06-25 ENCOUNTER — TELEPHONE (OUTPATIENT)
Dept: FAMILY MEDICINE | Facility: CLINIC | Age: 70
End: 2025-06-25
Payer: COMMERCIAL

## 2025-06-25 NOTE — TELEPHONE ENCOUNTER
Patient Quality Outreach    Patient is due for the following:   Diabetes -  A1C and Diabetic Follow-Up Visit  Colon Cancer Screening  Depression  -  PHQ-9 needed, Depression follow-up visit, and DAP  Physical Annual Wellness Visit    Action(s) Taken:   Schedule a Annual Wellness Visit    Type of outreach:    Sent [employer] message.    Questions for provider review:    None         Svetlana Price CMA  Chart routed to None.